# Patient Record
Sex: FEMALE | Race: WHITE | NOT HISPANIC OR LATINO | Employment: OTHER | URBAN - METROPOLITAN AREA
[De-identification: names, ages, dates, MRNs, and addresses within clinical notes are randomized per-mention and may not be internally consistent; named-entity substitution may affect disease eponyms.]

---

## 2023-08-03 LAB
CHOLESTEROL (MG/DL) IN SER/PLAS: 130 MG/DL (ref 0–199)
CHOLESTEROL IN HDL (MG/DL) IN SER/PLAS: 46.7 MG/DL
CHOLESTEROL/HDL RATIO: 2.8
LDL: 67 MG/DL (ref 0–99)
TRIGLYCERIDE (MG/DL) IN SER/PLAS: 84 MG/DL (ref 0–149)
VLDL: 17 MG/DL (ref 0–40)

## 2023-10-10 PROBLEM — G89.29 CHRONIC PAIN OF LEFT KNEE: Status: ACTIVE | Noted: 2023-10-10

## 2023-10-10 PROBLEM — G89.4 CHRONIC PAIN SYNDROME: Status: ACTIVE | Noted: 2023-10-10

## 2023-10-10 PROBLEM — E78.5 HYPERLIPIDEMIA: Status: ACTIVE | Noted: 2023-10-10

## 2023-10-10 PROBLEM — M25.562 CHRONIC PAIN OF LEFT KNEE: Status: ACTIVE | Noted: 2023-10-10

## 2023-10-10 PROBLEM — M48.061 LUMBAR STENOSIS: Status: ACTIVE | Noted: 2023-10-10

## 2023-10-10 PROBLEM — I34.0 MITRAL REGURGITATION: Status: ACTIVE | Noted: 2023-10-10

## 2023-10-10 PROBLEM — M25.561 CHRONIC PAIN OF RIGHT KNEE: Status: ACTIVE | Noted: 2023-10-10

## 2023-10-10 PROBLEM — I49.1 PAC (PREMATURE ATRIAL CONTRACTION): Status: ACTIVE | Noted: 2023-10-10

## 2023-10-10 PROBLEM — M17.0 PRIMARY OSTEOARTHRITIS OF BOTH KNEES: Status: ACTIVE | Noted: 2023-10-10

## 2023-10-10 PROBLEM — M84.454A INSUFFICIENCY FRACTURE OF PELVIS: Status: ACTIVE | Noted: 2023-10-10

## 2023-10-10 PROBLEM — I10 BENIGN ESSENTIAL HYPERTENSION: Status: ACTIVE | Noted: 2023-10-10

## 2023-10-10 PROBLEM — G89.29 CHRONIC RIGHT-SIDED LOW BACK PAIN WITHOUT SCIATICA: Status: ACTIVE | Noted: 2023-10-10

## 2023-10-10 PROBLEM — G89.29 CHRONIC PAIN OF RIGHT KNEE: Status: ACTIVE | Noted: 2023-10-10

## 2023-10-10 PROBLEM — M47.816 LUMBAR SPONDYLOSIS: Status: ACTIVE | Noted: 2023-10-10

## 2023-10-10 PROBLEM — I25.84 CORONARY ARTERY CALCIFICATION: Status: ACTIVE | Noted: 2023-10-10

## 2023-10-10 PROBLEM — R06.02 SOB (SHORTNESS OF BREATH) ON EXERTION: Status: ACTIVE | Noted: 2023-10-10

## 2023-10-10 PROBLEM — I25.10 CORONARY ARTERY CALCIFICATION: Status: ACTIVE | Noted: 2023-10-10

## 2023-10-10 PROBLEM — M54.50 CHRONIC RIGHT-SIDED LOW BACK PAIN WITHOUT SCIATICA: Status: ACTIVE | Noted: 2023-10-10

## 2023-10-10 RX ORDER — AMLODIPINE BESYLATE 10 MG/1
1 TABLET ORAL DAILY
COMMUNITY

## 2023-10-10 RX ORDER — PANTOPRAZOLE SODIUM 40 MG/1
1 TABLET, DELAYED RELEASE ORAL DAILY
COMMUNITY
Start: 2016-06-09

## 2023-10-10 RX ORDER — GABAPENTIN 100 MG/1
1 CAPSULE ORAL 2 TIMES DAILY
COMMUNITY
Start: 2022-03-22 | End: 2023-11-17 | Stop reason: DRUGHIGH

## 2023-10-10 RX ORDER — TRAZODONE HYDROCHLORIDE 50 MG/1
TABLET ORAL
COMMUNITY
Start: 2023-03-30

## 2023-10-10 RX ORDER — ROSUVASTATIN CALCIUM 20 MG/1
1 TABLET, COATED ORAL DAILY
COMMUNITY
Start: 2021-09-08 | End: 2024-03-04 | Stop reason: SDUPTHER

## 2023-10-10 RX ORDER — CLONAZEPAM 0.5 MG/1
TABLET, ORALLY DISINTEGRATING ORAL
COMMUNITY
End: 2024-03-04 | Stop reason: WASHOUT

## 2023-10-10 RX ORDER — QUINIDINE SULFATE 200 MG
TABLET ORAL
COMMUNITY
Start: 2022-02-21 | End: 2024-03-04 | Stop reason: WASHOUT

## 2023-10-10 RX ORDER — FLUOXETINE HYDROCHLORIDE 40 MG/1
CAPSULE ORAL
COMMUNITY
End: 2024-03-04 | Stop reason: WASHOUT

## 2023-10-10 RX ORDER — TRAMADOL HYDROCHLORIDE 50 MG/1
TABLET ORAL
COMMUNITY
Start: 2023-04-10 | End: 2023-11-17 | Stop reason: WASHOUT

## 2023-10-10 RX ORDER — METHOCARBAMOL 750 MG/1
1 TABLET, FILM COATED ORAL 3 TIMES DAILY PRN
COMMUNITY
Start: 2022-06-13 | End: 2023-11-17 | Stop reason: WASHOUT

## 2023-10-10 RX ORDER — GABAPENTIN 400 MG/1
1 CAPSULE ORAL NIGHTLY
COMMUNITY
Start: 2018-08-27 | End: 2023-11-17 | Stop reason: DRUGHIGH

## 2023-10-10 RX ORDER — LANCETS 33 GAUGE
EACH MISCELLANEOUS 2 TIMES DAILY
COMMUNITY
End: 2024-03-04 | Stop reason: WASHOUT

## 2023-10-10 RX ORDER — BLOOD SUGAR DIAGNOSTIC
STRIP MISCELLANEOUS 2 TIMES DAILY
COMMUNITY
Start: 2017-01-23 | End: 2024-03-04 | Stop reason: WASHOUT

## 2023-10-10 RX ORDER — FLUOXETINE HYDROCHLORIDE 20 MG/1
3 CAPSULE ORAL DAILY
COMMUNITY
Start: 2019-09-17 | End: 2024-03-04 | Stop reason: WASHOUT

## 2023-10-10 RX ORDER — GABAPENTIN 300 MG/1
CAPSULE ORAL 3 TIMES DAILY
COMMUNITY
End: 2023-11-17 | Stop reason: DRUGHIGH

## 2023-10-11 ENCOUNTER — OFFICE VISIT (OUTPATIENT)
Dept: PAIN MEDICINE | Facility: HOSPITAL | Age: 78
End: 2023-10-11
Payer: MEDICARE

## 2023-10-11 DIAGNOSIS — M96.1 POSTLAMINECTOMY SYNDROME, LUMBAR REGION: Primary | ICD-10-CM

## 2023-10-11 DIAGNOSIS — M79.7 FIBROMYALGIA: ICD-10-CM

## 2023-10-11 PROBLEM — M53.3 NONTRAUMATIC COCCYDYNIA: Status: RESOLVED | Noted: 2023-10-11 | Resolved: 2023-10-11

## 2023-10-11 PROBLEM — M53.3 NONTRAUMATIC COCCYDYNIA: Status: ACTIVE | Noted: 2023-10-11

## 2023-10-11 PROCEDURE — 99214 OFFICE O/P EST MOD 30 MIN: CPT | Performed by: ANESTHESIOLOGY

## 2023-10-11 PROCEDURE — 1125F AMNT PAIN NOTED PAIN PRSNT: CPT | Performed by: ANESTHESIOLOGY

## 2023-10-11 ASSESSMENT — PAIN - FUNCTIONAL ASSESSMENT: PAIN_FUNCTIONAL_ASSESSMENT: 0-10

## 2023-10-11 ASSESSMENT — PAIN SCALES - GENERAL: PAINLEVEL_OUTOF10: 6

## 2023-10-11 NOTE — PROGRESS NOTES
Subjective   Patient ID: Lauren Perez is a 77 y.o. female who presents for evaluation of tailbone pain.   Patient endorses she fell multiple times on her buttock in March after having COVID.  Since then she has had tailbone pain that has prohibited her from sitting, laying flat.  Patient endorses pain to be burning, stabbing, pinching, throbbing and constant.  Patient endorses sleeping at a 45 degree angle.  She uses a pillow without much relief.  Recently underwent SI injection in the interventional radiology procedure area with no relief of this pain.  Patient is also in aquatic therapy and continues to go 3 times a week.  Patient takes gabapentin and does not endorse relief of this pain.  She states that the pain is worse with sitting as well as with lying down.  She denies clear radicular symptoms to the lower extremities.    Review of Systems   All other systems reviewed and are negative.         Current Outpatient Medications:     amLODIPine (Norvasc) 10 mg tablet, Take 1 tablet (10 mg) by mouth once daily., Disp: , Rfl:     calcium cit/mag/D3/Zn//torrey (CALCIUM CITRATE PLUS ORAL), Take by mouth., Disp: , Rfl:     clonazePAM (KlonoPIN) 0.5 mg disintegrating tablet, Take by mouth., Disp: , Rfl:     FLUoxetine (PROzac) 20 mg capsule, Take 3 capsules (60 mg) by mouth once daily., Disp: , Rfl:     FLUoxetine (PROzac) 40 mg capsule, Take by mouth., Disp: , Rfl:     gabapentin (Neurontin) 100 mg capsule, Take 1 capsule (100 mg) by mouth 2 times a day., Disp: , Rfl:     gabapentin (Neurontin) 300 mg capsule, Take by mouth 3 times a day. Take 1 capsule in the AM and 2 capsules HS, Disp: , Rfl:     gabapentin (Neurontin) 400 mg capsule, Take 1 capsule (400 mg) by mouth once daily at bedtime., Disp: , Rfl:     lancets 33 gauge misc, 2 times a day. Use to test blood sugar, Disp: , Rfl:     methocarbamol (Robaxin) 750 mg tablet, Take 1 tablet (750 mg) by mouth 3 times a day as needed for muscle spasms., Disp: , Rfl:      mv-mn-iron-FA-herbal cmplx#190 (Vitamin D3 Complete) 18 mg iron-800 mcg-150 mg tablet, Take by mouth., Disp: , Rfl:     mv-mn/om3/dha/epa/fish/lut/savanna (OCUVITE ADULT 50 PLUS ORAL), Take as directed, Disp: , Rfl:     OneTouch Ultra Test strip, 2 times a day.  USE TO TEST BLOOD SUGAR, Disp: , Rfl:     pantoprazole (ProtoNix) 40 mg EC tablet, Take 1 tablet (40 mg) by mouth once daily., Disp: , Rfl:     rosuvastatin (Crestor) 20 mg tablet, Take 1 tablet (20 mg) by mouth once daily., Disp: , Rfl:     traMADol (Ultram) 50 mg tablet, , Disp: , Rfl:     traZODone (Desyrel) 50 mg tablet, , Disp: , Rfl:      Past Medical History:   Diagnosis Date    Encounter for screening for cardiovascular disorders 10/30/2019    Screening for heart disease        Past Surgical History:   Procedure Laterality Date    OTHER SURGICAL HISTORY  04/13/2022    Lumbar vertebral fusion        Family History   Problem Relation Name Age of Onset    Heart attack Mother      Heart attack Father          Allergies   Allergen Reactions    Codeine Other     chest pain    Sulfamethoxazole-Trimethoprim Unknown    Ace Inhibitors Rash        Objective     There were no vitals filed for this visit.     Physical Exam    GENERAL EXAM  Vital Signs: Vital signs to include heart rate, respiration rate, blood pressure, and temperature were reviewed.  General Appearance:  Awake, alert, healthy appearing, well developed, No acute distress.  Head: Normocephalic without evidence of head injury.  Neck: The appearance of the neck was normal without swelling with a midline trachea.  Eyes: The eyelids and eyebrows exhibited no abnormalities.  Pupils were not pin-point.  Sclera was without icterus.  Lungs: Respiration rhythm and depth was normal.  Respiratory movements were normal without labored breathing.  Cardiovascular: No peripheral edema was present.    Neurological: Patient was oriented to time, place, and person.  Speech was normal.  Balance, gait, and stance  were unremarkable.    Psychiatric: Appearance was normal with appropriate dress.  Mood was euthymic and affect was normal.  Skin: Affected regions were without ecchymosis or skin lesions.      Assessment/Plan   Problem List Items Addressed This Visit             ICD-10-CM    Postlaminectomy syndrome, lumbar region - Primary M96.1    Relevant Orders    Tens Device Four Lead    Referral to Physical Medicine Rehab    Referral to [facility]    Fibromyalgia M79.7    Relevant Orders    Tens Device Four Lead    Referral to Physical Medicine Rehab    Referral to [facility]     77 y.o. female who presents for with buttock pain. Pain seems to be a combination of of fibromyalgia, chronic sacroiliitis, known trochanteric bursitis and possible piriformis.  She has diffuse tenderness over her muscles suggestive of fibromyalgia.  She is able to sit comfortably especially if slouching down thus making piriformis syndrome less likely.  While she is tender over the sacroiliac joint, provocative sacroiliac joint maneuvers today are not reproducing her symptoms.  Pain is not well localized on physical exam. She had no tenderness over coccyx. She is sitting in the chair in the room however, endorses pain while sitting upright but not in a slouching posture. Negative TAVO and MAIK.  Previous SI joint injection did not provide relief, she continues to have this pain while taking gabapentin and doing aquatic therapy.  Discussed with patient that it is unclear, in her case, pinpointing the exact etiology of her pain and that she would benefit from an evaluation by our rehabilitation specialist colleagues.       Plan:  PMR referral   Refer to integrative pain medicine  TENS unit

## 2023-10-20 DIAGNOSIS — M25.552 CHRONIC HIP PAIN, BILATERAL: ICD-10-CM

## 2023-10-20 DIAGNOSIS — G89.29 CHRONIC HIP PAIN, BILATERAL: ICD-10-CM

## 2023-10-20 DIAGNOSIS — M25.551 CHRONIC HIP PAIN, BILATERAL: ICD-10-CM

## 2023-10-24 ENCOUNTER — APPOINTMENT (OUTPATIENT)
Dept: ORTHOPEDIC SURGERY | Facility: CLINIC | Age: 78
End: 2023-10-24
Payer: MEDICARE

## 2023-11-07 ENCOUNTER — ANCILLARY PROCEDURE (OUTPATIENT)
Dept: RADIOLOGY | Facility: CLINIC | Age: 78
End: 2023-11-07
Payer: MEDICARE

## 2023-11-07 ENCOUNTER — OFFICE VISIT (OUTPATIENT)
Dept: ORTHOPEDIC SURGERY | Facility: CLINIC | Age: 78
End: 2023-11-07
Payer: MEDICARE

## 2023-11-07 VITALS — HEIGHT: 61 IN | WEIGHT: 125 LBS | BODY MASS INDEX: 23.6 KG/M2

## 2023-11-07 DIAGNOSIS — M70.61 TROCHANTERIC BURSITIS OF BOTH HIPS: Primary | ICD-10-CM

## 2023-11-07 DIAGNOSIS — M25.552 CHRONIC HIP PAIN, BILATERAL: ICD-10-CM

## 2023-11-07 DIAGNOSIS — G89.29 CHRONIC HIP PAIN, BILATERAL: ICD-10-CM

## 2023-11-07 DIAGNOSIS — M70.62 TROCHANTERIC BURSITIS OF BOTH HIPS: Primary | ICD-10-CM

## 2023-11-07 DIAGNOSIS — M79.7 FIBROMYALGIA: ICD-10-CM

## 2023-11-07 DIAGNOSIS — M96.1 POSTLAMINECTOMY SYNDROME, LUMBAR REGION: ICD-10-CM

## 2023-11-07 DIAGNOSIS — M25.551 CHRONIC HIP PAIN, BILATERAL: ICD-10-CM

## 2023-11-07 PROCEDURE — 3078F DIAST BP <80 MM HG: CPT | Performed by: ORTHOPAEDIC SURGERY

## 2023-11-07 PROCEDURE — 99213 OFFICE O/P EST LOW 20 MIN: CPT | Performed by: ORTHOPAEDIC SURGERY

## 2023-11-07 PROCEDURE — 1159F MED LIST DOCD IN RCRD: CPT | Performed by: ORTHOPAEDIC SURGERY

## 2023-11-07 PROCEDURE — 73522 X-RAY EXAM HIPS BI 3-4 VIEWS: CPT | Mod: BILATERAL PROCEDURE | Performed by: RADIOLOGY

## 2023-11-07 PROCEDURE — 1160F RVW MEDS BY RX/DR IN RCRD: CPT | Performed by: ORTHOPAEDIC SURGERY

## 2023-11-07 PROCEDURE — 20610 DRAIN/INJ JOINT/BURSA W/O US: CPT | Performed by: ORTHOPAEDIC SURGERY

## 2023-11-07 PROCEDURE — 73522 X-RAY EXAM HIPS BI 3-4 VIEWS: CPT | Mod: FY

## 2023-11-07 PROCEDURE — 3075F SYST BP GE 130 - 139MM HG: CPT | Performed by: ORTHOPAEDIC SURGERY

## 2023-11-07 PROCEDURE — 1036F TOBACCO NON-USER: CPT | Performed by: ORTHOPAEDIC SURGERY

## 2023-11-07 PROCEDURE — 1125F AMNT PAIN NOTED PAIN PRSNT: CPT | Performed by: ORTHOPAEDIC SURGERY

## 2023-11-07 RX ORDER — TRIAMCINOLONE ACETONIDE 40 MG/ML
40 INJECTION, SUSPENSION INTRA-ARTICULAR; INTRAMUSCULAR
Status: COMPLETED | OUTPATIENT
Start: 2023-11-07 | End: 2023-11-07

## 2023-11-07 RX ORDER — LIDOCAINE HYDROCHLORIDE 10 MG/ML
1 INJECTION INFILTRATION; PERINEURAL
Status: COMPLETED | OUTPATIENT
Start: 2023-11-07 | End: 2023-11-07

## 2023-11-07 RX ORDER — ALBUTEROL SULFATE 90 UG/1
AEROSOL, METERED RESPIRATORY (INHALATION)
COMMUNITY
Start: 2019-12-16

## 2023-11-07 RX ADMIN — LIDOCAINE HYDROCHLORIDE 1 ML: 10 INJECTION INFILTRATION; PERINEURAL at 18:19

## 2023-11-07 RX ADMIN — TRIAMCINOLONE ACETONIDE 40 MG: 40 INJECTION, SUSPENSION INTRA-ARTICULAR; INTRAMUSCULAR at 18:19

## 2023-11-07 NOTE — PROGRESS NOTES
78-year-old is seen with bilateral hip pain.  She is been having persistent moderate throbbing pain in the lateral side of both hips.  She typically has relief with injections.  She was recently in pain management and was referred to physiatry and to [facility].    Pleasant in no acute distress.  Walks a mildly antalgic gait.  Both hips flex 90 degrees with adequate internal/external rotation there is tenderness on the greater trochanteric bursa both hips.    Discussion about greater trochanteric bursitis was done.  Treatment options were reviewed and the decision was made to proceed with cortisone injections.  She also recently has had diarrhea and was advised to discuss this with her primary care doctor.  She was also advised to schedule with physiatry and [facility] as recommended by pain management.    L Inj/Asp: bilateral greater trochanteric bursa on 11/7/2023 6:19 PM  Indications: pain  Details: 22 G needle, lateral approach  Medications (Right): 1 mL lidocaine 10 mg/mL (1 %); 40 mg triamcinolone acetonide 40 mg/mL  Medications (Left): 1 mL lidocaine 10 mg/mL (1 %); 40 mg triamcinolone acetonide 40 mg/mL  Outcome: tolerated well, no immediate complications  Procedure, treatment alternatives, risks and benefits explained, specific risks discussed. Consent was given by the patient. Immediately prior to procedure a time out was called to verify the correct patient, procedure, equipment, support staff and site/side marked as required. Patient was prepped and draped in the usual sterile fashion.

## 2023-11-17 ENCOUNTER — CONSULT (OUTPATIENT)
Dept: PHYSICAL MEDICINE AND REHAB | Facility: CLINIC | Age: 78
End: 2023-11-17
Payer: MEDICARE

## 2023-11-17 VITALS
SYSTOLIC BLOOD PRESSURE: 125 MMHG | WEIGHT: 128 LBS | DIASTOLIC BLOOD PRESSURE: 61 MMHG | BODY MASS INDEX: 24.19 KG/M2 | HEART RATE: 64 BPM | TEMPERATURE: 97.5 F

## 2023-11-17 DIAGNOSIS — M79.7 FIBROMYALGIA: ICD-10-CM

## 2023-11-17 DIAGNOSIS — M46.1 BILATERAL SACROILIITIS (CMS-HCC): Primary | ICD-10-CM

## 2023-11-17 DIAGNOSIS — M79.18 MYOFASCIAL MUSCLE PAIN: ICD-10-CM

## 2023-11-17 PROCEDURE — 99205 OFFICE O/P NEW HI 60 MIN: CPT | Performed by: PHYSICAL MEDICINE & REHABILITATION

## 2023-11-17 RX ORDER — DULOXETIN HYDROCHLORIDE 30 MG/1
30 CAPSULE, DELAYED RELEASE ORAL DAILY
Status: DISCONTINUED | OUTPATIENT
Start: 2023-11-17 | End: 2024-02-16

## 2023-11-17 NOTE — PROGRESS NOTES
Dear Dr. Marley, I had the pleasure of meeting Lauren Perez, a 78 year old female with PMH of lumbar laminectomy and fusion L4, 5, S1 in 2018, HTN, HLD, esophageal obstruction, arthritis of the knees, fibromyalgia, depression, and anxiety here for evaluation of chronic pain in the tailbone and buttock area     TIMELINE OF COMPLAINT(S):  Back surgery in 2018 was helpful. Symptoms started over a year ago, pain exacerbated after falling in March 2023 with associated rib fracture. Lost about 40 pounds In the last 2 years d/t issues with esophagus. Has been sitting on U shaped cushion which has not been helpful, difficulty getting comfortable    Of note, has had injections for her bilateral knees (about 3 months ago) and bilateral intertrochanteric bursa (a couple weeks ago) with Dr. Marley which were helpful. Saw Dr. Hendrickson, received epidural in the past which made symptoms worse, SIJ injection as well which was not helpful    Pain:   LOCATION- Tailbone, worse on right  RADIATION- None  NUMBNESS/TINGLING- No  WEAKNESS- No  CONSTANT or INTERMITTENT- Constant  SEVERITY/QUANTITY- 9/10  QUALITY- Sharp, achy, pressure, gnawing  EXACERBATED BY- Sitting on it, long distance walking  BETTER WITH- icing  TRIED- tramadol after fall, no longer taking. Diclofenac gel once daily      Anti-Inflammatories: tylenol helpful for knees      Muscle relaxants: Robaxin many years ago for different issue      Anti-depressants: Fluoxetine (Dr. Ruvalcaba) trazodone. States she tried cymbalta many years ago      Neuroleptics: gabapentin 300 mg in the morning, 600 mg qhs     PHYSICAL THERAPY: aquatherapy 1 year ago, has not been going over the last 2 months, no PT for this issue  TENS unit: None  CHIROPRACTIC MANIPULATION: None  ACUPUNCTURE TREATMENTS: None  DEEP TISSUE MASSAGE THERAPY: None  OSTEOPATHIC MANIPULATION THERAPY: None  INJECTIONS: Knee, JOSE, bursa injections as described  EMG/NCS: None     IMAGING:  Bilateral hip x rays from 11/8/23: Mild  OA of b/l hip and SI joints    Knee x ray from 5/22/23: Mild medial compartment OA L>R    X ray lumbar spine 6/13/22: Mild L2 vertebral compression fx, laminectomy with L4-S1 fusion    MRI pelvis 3/8/22: Mild bilateral hip osteoarthritis, No MR evidence of internal derangement of the pelvis    MRI lumbar spine 3/8/22: bilateral foraminal narrowing, post surgical changes stable, no residual canal stenosis    FUNCTIONAL HISTORY: The patient is independent in all ADLs, mobility, and driving. The patient will rarely use a cane or walker for long distances     SH:  Lives in: apartment, no steps, uses elevator  Lives with: Lives alone, brother in [facility]  Occupation: Retired   Tobacco: Denies  Alcohol: Denies  Drugs: Denies     ROS: Reports mild urinary incontinence, likely stress incontinence that has not worsened. SOB with prolonged activity. Reports anxiety controlled with medication. The patient denies any bowel incontinence/accidents, night sweats, fevers, chills, recent significant weight loss. A 14 point review of systems was reviewed with the patient and is as above and otherwise negative. Please see scanned questionnaire for more details.     PE:  GEN - Alert, well-developed, well-nourished, no acute distress  PSYCH - Cooperative, appropriate mood and affect  HEENT - NC/AT  RESP - Non-labored respirations, equal expansion  CV - warm and well-perfused, No cyanosis or edema in extremities.  ABD- soft, ND  SKIN - No rash.     BACK/SPINE - Symmetric posture, no erythema, edema, or swelling. No pain with extension, rotation, or side bend. No pain with facet loading. TTP over L% and sacral spine. TTP over b/l paraspinal muscles R > L. TTP over bilateral SIJ R>L, extremely tender over b/l GT bursa, TTP over IT band bilaterally. No tenderness over the iliolumbar ligaments bilaterally. No TTP of bilateral PSIS.     HIPS/PELVIS - Symmetric in standing and lying Passive hip flexion, internal rotation,  and external rotation within functional normal limits bilaterally without provocation of pain symptoms. No tenderness over iliopsoas tendon. FABERs + bilaterally to tonya and lateral hip with R>L. Negative hip clicking. hip Negative hip impingement test. Negative log roll.      NEURO -   LE strength 5/5 -  including hip flexors, knee flexors, knee extensors, ankle dorsiflexors, ankle plantar flexors, and EHL  Sensation - intact to light touch in bilateral lower extremities.  Reflexes - 2+ biceps, brachioradialis, triceps, patellar and Achilles reflexes bilaterally No Clonus, No Babinski, Caballero's negative bilaterally  GAIT - Antalgic, Normal base, normal stride length    PLAN:   Lauren Perez, a 78 year old female with PMH of lumbar laminectomy and fusion L4, 5, S1 in 2018, HTN, HLD, esophageal obstruction, arthritis of the knees, fibromyalgia, depression, and anxiety here for evaluation of chronic pain in the tailbone and buttock area. Pain appears multifactorial with SIJ dysfunction, myofascial pain, and fibromyalgia all likely contributing. Exam reassuring for lack of neurological compromise.    -Taper Fluoxetine with goal of starting cymbalta given it is just as useful for pain as for anxiety/depression  -Taper down to 40 mg of fluoxetine today, then 20 mg after 2 weeks, then stop the fluoxetine 20 mg after 2 weeks and begin cymbalta  -Will place prescription for Cymbalta now, begin once you have completed Fluoxetine taper  -Cymbalta initially at 30 mg daily for the first week, increasing to 60 mg after the first week  -Patient had already stopped taking gabapentin, will discontinue gabapentin for now, con consider resuming at higher dose in future  -Patient does have exercise bike, emphasized importance of low aerobic impact exercise  -Begin applying diclofenac QID to affected area  -Can consider resuming aquatherapy, trigger point injections, SIJ injections, muscle relaxers, higher dose of gabapentin in the  future  -Follow up in 2 months      The patient expressed understanding and agreement with the assessment and plan. Patient encouraged to contact us should they have any questions, concerns, or any changes in symptoms.     Thank you for allowing me to participate in the care of your patient.    Patient seen and examined by resident physician with attending physician supervision, Dr. Dickerson. Attending agrees with history, assessment, and plan as described in note.    Theresa Mcfadden,    PM&R PGY-4

## 2023-11-17 NOTE — PATIENT INSTRUCTIONS
-Taper Fluoxetine with goal of starting cymbalta given it is just as useful for pain as for anxiety/depression  -Taper down to 40 mg of fluoxetine today, then to 20 mg after 2 weeks, then stop the fluoxetine 20 mg after 2 weeks and begin cymbalta 30 mg daily  -Will place prescription for Cymbalta now, begin once you have completed Fluoxetine taper  -Cymbalta initially at 30 mg daily for the first week, increasing to 60 mg after the first week  -Patient had already stopped taking gabapentin, will discontinue gabapentin for now  -Patient does have exercise bike, emphasized importance of low aerobic impact exercise  -Begin applying diclofenac four times daily to affected area  -Can consider resuming aquatherapy, trigger point injections, muscle relaxers, higher dose of gabapentin in the future  -Follow up in 2 months

## 2024-01-19 ENCOUNTER — APPOINTMENT (OUTPATIENT)
Dept: PHYSICAL MEDICINE AND REHAB | Facility: CLINIC | Age: 79
End: 2024-01-19
Payer: MEDICARE

## 2024-02-16 ENCOUNTER — OFFICE VISIT (OUTPATIENT)
Dept: PHYSICAL MEDICINE AND REHAB | Facility: CLINIC | Age: 79
End: 2024-02-16
Payer: MEDICARE

## 2024-02-16 DIAGNOSIS — M96.1 POSTLAMINECTOMY SYNDROME, LUMBAR REGION: Primary | ICD-10-CM

## 2024-02-16 DIAGNOSIS — G89.29 CHRONIC RIGHT-SIDED LOW BACK PAIN WITHOUT SCIATICA: ICD-10-CM

## 2024-02-16 DIAGNOSIS — M79.7 FIBROMYALGIA: ICD-10-CM

## 2024-02-16 DIAGNOSIS — M46.1 BILATERAL SACROILIITIS (CMS-HCC): ICD-10-CM

## 2024-02-16 DIAGNOSIS — M54.50 CHRONIC RIGHT-SIDED LOW BACK PAIN WITHOUT SCIATICA: ICD-10-CM

## 2024-02-16 PROCEDURE — 1036F TOBACCO NON-USER: CPT | Performed by: STUDENT IN AN ORGANIZED HEALTH CARE EDUCATION/TRAINING PROGRAM

## 2024-02-16 PROCEDURE — 1125F AMNT PAIN NOTED PAIN PRSNT: CPT | Performed by: STUDENT IN AN ORGANIZED HEALTH CARE EDUCATION/TRAINING PROGRAM

## 2024-02-16 PROCEDURE — 99214 OFFICE O/P EST MOD 30 MIN: CPT | Performed by: PHYSICAL MEDICINE & REHABILITATION

## 2024-02-16 PROCEDURE — 1157F ADVNC CARE PLAN IN RCRD: CPT | Performed by: STUDENT IN AN ORGANIZED HEALTH CARE EDUCATION/TRAINING PROGRAM

## 2024-02-16 RX ORDER — DULOXETIN HYDROCHLORIDE 30 MG/1
30 CAPSULE, DELAYED RELEASE ORAL DAILY
Status: DISCONTINUED | OUTPATIENT
Start: 2024-02-17 | End: 2024-02-23

## 2024-02-16 RX ORDER — GABAPENTIN 300 MG/1
300 CAPSULE ORAL 3 TIMES DAILY
Qty: 270 CAPSULE | Refills: 1 | Status: SHIPPED | OUTPATIENT
Start: 2024-02-16 | End: 2024-08-14

## 2024-02-16 NOTE — PATIENT INSTRUCTIONS
-Begin gabapentin 300 mg TID. Patient has previously tried gabapentin  but was not taking consistently. Tolerated doses up to 600 mg in the past  -New prescription for Cymbalta, Initially 30 mg cymbalta for 1 week with instructions to uptitrate to 60 mg daily  -Begin low impact aerobic exercise with exercise bike once pain more manageable  -MRI lumbar spine ordered  -Follow up in 6 weeks once lumbar MRI obtained

## 2024-02-16 NOTE — PROGRESS NOTES
"Lauren Perez is a 78 year old female with PMH of lumbar laminectomy and fusion L4, 5, S1 in 2018, HTN, HLD, esophageal obstruction, arthritis of the knees, fibromyalgia, depression, and anxiety here for evaluation of chronic pain in the tailbone and buttock area    INTERVAL HISTORY:  Voltaren has been somewhat helpful. Has not been doing low impact aerobic exercise (exercise bike) due to patient described severe gastric issue with constipation, recently resolved, polyps removed following colonoscopy. Patient weaned self off fluoxetine but was unable to get cymbalta from pharmacy. Patient did get lumbar x ray from outside facility recently on 1/22/24 showing age indeterminate L2 compression fracture, narrowing L2-3.     RECALL:  \"Back surgery in 2018 was helpful. Symptoms started over a year ago, pain exacerbated after falling in March 2023 with associated rib fracture. Lost about 40 pounds In the last 2 years d/t issues with esophagus. Has been sitting on U shaped cushion which has not been helpful, difficulty getting comfortable     Of note, has had injections for her bilateral knees (about 3 months ago) and bilateral intertrochanteric bursa (a couple weeks ago) with Dr. Marley which were helpful. Saw Dr. Hendrickson, received epidural in the past which made symptoms worse, SIJ injection as well which was not helpful\"    PRIOR MEDS:  TRIED- tramadol after fall, no longer taking. Diclofenac gel once daily      Anti-Inflammatories: tylenol helpful for knees      Muscle relaxants: Robaxin many years ago for different issue      Anti-depressants: Fluoxetine (Dr. Ruvalcaba) trazodone. States she tried cymbalta many years ago      Neuroleptics: gabapentin 300 mg in the morning, 600 mg at bedtime    IMAGING:  Lumbar x ray from 1/22/24: age indeterminate L2 compression fracture, narrowing L2-3.      Bilateral hip x rays from 11/8/23: Mild OA of b/l hip and SI joints     Knee x ray from 5/22/23: Mild medial compartment OA L>R     X " ray lumbar spine 6/13/22: Mild L2 vertebral compression fx, laminectomy with L4-S1 fusion     MRI pelvis 3/8/22: Mild bilateral hip osteoarthritis, No MR evidence of internal derangement of the pelvis     MRI lumbar spine 3/8/22: bilateral foraminal narrowing, post surgical changes stable, no residual canal stenosis    FUNCTIONAL HISTORY: The patient is independent in all ADLs, mobility, and driving. The patient will use a cane or walker for long distances     SH:  Lives in: apartment, no steps, uses elevator  Lives with: Lives alone, brother in [facility]  Occupation: Retired   Tobacco: Denies  Alcohol: Denies  Drugs: Denies    PE:  GEN - Alert, well-developed, well-nourished, no acute distress  PSYCH - Cooperative, appropriate mood and affect  HEENT - NC/AT  RESP - Non-labored respirations, equal expansion  CV - warm and well-perfused, No cyanosis or edema in extremities.  ABD- soft, ND  SKIN - No rash.     BACK/SPINE - Pain with extension, rotation, or side bend. TTP over lumbar and sacral spine. TTP over b/l paraspinal muscles R > L. TTP over bilateral SIJ R>L, extremely tender over b/l GT bursa, TTP over IT band bilaterally.  SLR negative bilaterally     HIPS/PELVIS -  FABERs + bilaterally to tonya and lateral hip with R>L. Negative hip clicking. Negative hip impingement test. Negative log roll.      NEURO -   LE strength 5/5 -  including hip flexors, knee flexors, knee extensors, ankle dorsiflexors, ankle plantar flexors, and EHL  Sensation - intact to light touch in bilateral lower extremities.  GAIT - Antalgic, right sided limp, poor balance    PLAN:   Lauren Perez, a 78 year old female with PMH of lumbar laminectomy and fusion L4, 5, S1 in 2018, HTN, HLD, esophageal obstruction, arthritis of the knees, fibromyalgia, depression, and anxiety here for evaluation of chronic pain in the tailbone and buttock area. Pain appears multifactorial with SIJ dysfunction, lumbar stenosis, post  laminectomy syndrome, myofascial pain, and fibromyalgia all likely contributing. Exam reassuring for lack of neurological compromise.       -Begin gabapentin 300 mg TID by patient's request. Patient has previously tried gabapentin  but was not taking consistently. Tolerated doses up to 600 mg in the past.  -New prescription provided for Cymbalta, Initially 30 mg cymbalta for 1 week with instructions to uptitrate to 60 mg daily  -Begin low impact aerobic exercise with exercise bike once pain more manageable  -MRI lumbar spine ordered as she had a fall since most recent lumbar MRI in 2022. Patient did have lumbar compression fracture noted on prior imaging prior to fall in spring of 2023  -Follow up in 6 weeks once lumbar MRI obtained    Patient seen and examined by resident with attending supervision (Dr. Dickerson), attending agrees with history, exam, and plan as described in the note above    Theresa Mcfadden DO   PM&R PGY-IV

## 2024-02-23 ENCOUNTER — OFFICE VISIT (OUTPATIENT)
Dept: ORTHOPEDIC SURGERY | Facility: CLINIC | Age: 79
End: 2024-02-23
Payer: MEDICARE

## 2024-02-23 VITALS — BODY MASS INDEX: 23.03 KG/M2 | WEIGHT: 122 LBS | HEIGHT: 61 IN

## 2024-02-23 DIAGNOSIS — M79.7 FIBROMYALGIA: ICD-10-CM

## 2024-02-23 DIAGNOSIS — M25.551 CHRONIC RIGHT HIP PAIN: ICD-10-CM

## 2024-02-23 DIAGNOSIS — M79.18 MYOFASCIAL MUSCLE PAIN: Primary | ICD-10-CM

## 2024-02-23 DIAGNOSIS — G89.29 CHRONIC RIGHT-SIDED LOW BACK PAIN WITHOUT SCIATICA: ICD-10-CM

## 2024-02-23 DIAGNOSIS — M46.1 BILATERAL SACROILIITIS (CMS-HCC): ICD-10-CM

## 2024-02-23 DIAGNOSIS — M70.62 TROCHANTERIC BURSITIS OF BOTH HIPS: Primary | ICD-10-CM

## 2024-02-23 DIAGNOSIS — G89.29 CHRONIC RIGHT HIP PAIN: ICD-10-CM

## 2024-02-23 DIAGNOSIS — M70.61 TROCHANTERIC BURSITIS OF BOTH HIPS: Primary | ICD-10-CM

## 2024-02-23 DIAGNOSIS — M54.50 CHRONIC RIGHT-SIDED LOW BACK PAIN WITHOUT SCIATICA: ICD-10-CM

## 2024-02-23 PROCEDURE — 1159F MED LIST DOCD IN RCRD: CPT | Performed by: ORTHOPAEDIC SURGERY

## 2024-02-23 PROCEDURE — 20610 DRAIN/INJ JOINT/BURSA W/O US: CPT | Performed by: ORTHOPAEDIC SURGERY

## 2024-02-23 PROCEDURE — 1036F TOBACCO NON-USER: CPT | Performed by: ORTHOPAEDIC SURGERY

## 2024-02-23 PROCEDURE — 1160F RVW MEDS BY RX/DR IN RCRD: CPT | Performed by: ORTHOPAEDIC SURGERY

## 2024-02-23 PROCEDURE — 99213 OFFICE O/P EST LOW 20 MIN: CPT | Performed by: ORTHOPAEDIC SURGERY

## 2024-02-23 PROCEDURE — 1157F ADVNC CARE PLAN IN RCRD: CPT | Performed by: ORTHOPAEDIC SURGERY

## 2024-02-23 PROCEDURE — 1125F AMNT PAIN NOTED PAIN PRSNT: CPT | Performed by: ORTHOPAEDIC SURGERY

## 2024-02-23 RX ORDER — DULOXETIN HYDROCHLORIDE 30 MG/1
30 CAPSULE, DELAYED RELEASE ORAL DAILY
Qty: 30 CAPSULE | Refills: 11 | Status: SHIPPED | OUTPATIENT
Start: 2024-02-23 | End: 2024-05-08 | Stop reason: ALTCHOICE

## 2024-02-23 RX ORDER — LIDOCAINE HYDROCHLORIDE 10 MG/ML
2 INJECTION INFILTRATION; PERINEURAL
Status: COMPLETED | OUTPATIENT
Start: 2024-02-23 | End: 2024-02-23

## 2024-02-23 RX ORDER — TRIAMCINOLONE ACETONIDE 40 MG/ML
40 INJECTION, SUSPENSION INTRA-ARTICULAR; INTRAMUSCULAR
Status: COMPLETED | OUTPATIENT
Start: 2024-02-23 | End: 2024-02-23

## 2024-02-23 RX ADMIN — TRIAMCINOLONE ACETONIDE 40 MG: 40 INJECTION, SUSPENSION INTRA-ARTICULAR; INTRAMUSCULAR at 14:52

## 2024-02-23 RX ADMIN — LIDOCAINE HYDROCHLORIDE 2 ML: 10 INJECTION INFILTRATION; PERINEURAL at 14:52

## 2024-02-23 NOTE — PROGRESS NOTES
78-year-old is seen with right hip pain.  Left hip has bothered her previously but is not currently giving her significant pain.  She has been having persistent moderate throbbing pain on the lateral side of the hip.  Pain is worse with standing and walking.  She is undergoing evaluation and pain management for her back and has an MRI scheduled.  X-rays have demonstrated a L2 compression fracture.    Pleasant in no acute distress.  Walks an antalgic gait.  Right hip flexes 90 degrees with adequate internal and external rotation.  There is tenderness over the trochanteric bursa.  Hip flexes 90 degrees with adequate internal and external rotation and no significant tenderness over the trochanteric bursa.    Multiple x-ray views of the lumbosacral spine are personally reviewed and there is a L2 compression fracture with slightly less than 50% height loss.  The lumbar fusion is visualized.  There is degenerative changes.    A discussion about her pain was done.  Decision was made to proceed with trochanteric bursa injection.  She will continue with the pain management recommendations and follow-up based on her symptoms.    L Inj/Asp: R greater trochanteric bursa on 2/23/2024 2:52 PM  Indications: pain  Details: 22 G needle, lateral approach  Medications: 40 mg triamcinolone acetonide 40 mg/mL; 2 mL lidocaine 10 mg/mL (1 %)  Procedure, treatment alternatives, risks and benefits explained, specific risks discussed. Consent was given by the patient.

## 2024-02-27 ENCOUNTER — HOSPITAL ENCOUNTER (OUTPATIENT)
Dept: RADIOLOGY | Facility: HOSPITAL | Age: 79
Discharge: HOME | End: 2024-02-27
Payer: MEDICARE

## 2024-02-27 DIAGNOSIS — G89.29 CHRONIC RIGHT-SIDED LOW BACK PAIN WITHOUT SCIATICA: ICD-10-CM

## 2024-02-27 DIAGNOSIS — M54.50 CHRONIC RIGHT-SIDED LOW BACK PAIN WITHOUT SCIATICA: ICD-10-CM

## 2024-02-27 DIAGNOSIS — M96.1 POSTLAMINECTOMY SYNDROME, LUMBAR REGION: ICD-10-CM

## 2024-02-27 PROBLEM — E11.9 CONTROLLED TYPE 2 DIABETES MELLITUS WITHOUT COMPLICATION (MULTI): Status: ACTIVE | Noted: 2022-10-06

## 2024-02-27 PROBLEM — M19.90 ARTHRITIS: Status: ACTIVE | Noted: 2024-02-27

## 2024-02-27 PROBLEM — M77.12 LATERAL EPICONDYLITIS OF LEFT ELBOW: Status: ACTIVE | Noted: 2024-02-27

## 2024-02-27 PROBLEM — K21.9 GERD (GASTROESOPHAGEAL REFLUX DISEASE): Status: ACTIVE | Noted: 2024-02-27

## 2024-02-27 PROBLEM — M25.519 SHOULDER PAIN: Status: ACTIVE | Noted: 2024-02-27

## 2024-02-27 PROBLEM — R05.9 COUGH: Status: ACTIVE | Noted: 2024-02-27

## 2024-02-27 PROBLEM — F32.A ANXIETY AND DEPRESSION: Status: ACTIVE | Noted: 2022-10-06

## 2024-02-27 PROBLEM — F41.9 ANXIETY AND DEPRESSION: Status: ACTIVE | Noted: 2022-10-06

## 2024-02-27 PROBLEM — H91.13 PRESBYCUSIS, BILATERAL: Status: ACTIVE | Noted: 2023-08-31

## 2024-02-27 PROBLEM — M75.82 TENDINITIS OF LEFT ROTATOR CUFF: Status: ACTIVE | Noted: 2024-02-27

## 2024-02-27 PROBLEM — I10 HYPERTENSION: Status: ACTIVE | Noted: 2024-02-27

## 2024-02-27 PROCEDURE — 72148 MRI LUMBAR SPINE W/O DYE: CPT | Performed by: RADIOLOGY

## 2024-02-27 PROCEDURE — 72148 MRI LUMBAR SPINE W/O DYE: CPT

## 2024-03-04 ENCOUNTER — OFFICE VISIT (OUTPATIENT)
Dept: CARDIOLOGY | Facility: CLINIC | Age: 79
End: 2024-03-04
Payer: MEDICARE

## 2024-03-04 VITALS
OXYGEN SATURATION: 97 % | HEIGHT: 61 IN | BODY MASS INDEX: 24.2 KG/M2 | SYSTOLIC BLOOD PRESSURE: 110 MMHG | DIASTOLIC BLOOD PRESSURE: 60 MMHG | HEART RATE: 78 BPM | WEIGHT: 128.2 LBS

## 2024-03-04 DIAGNOSIS — I10 BENIGN ESSENTIAL HYPERTENSION: ICD-10-CM

## 2024-03-04 DIAGNOSIS — I49.1 PAC (PREMATURE ATRIAL CONTRACTION): ICD-10-CM

## 2024-03-04 DIAGNOSIS — I34.0 MITRAL VALVE INSUFFICIENCY, UNSPECIFIED ETIOLOGY: ICD-10-CM

## 2024-03-04 DIAGNOSIS — I25.84 CORONARY ARTERY CALCIFICATION: ICD-10-CM

## 2024-03-04 DIAGNOSIS — E78.00 PURE HYPERCHOLESTEROLEMIA: Primary | ICD-10-CM

## 2024-03-04 DIAGNOSIS — I25.10 CORONARY ARTERY CALCIFICATION: ICD-10-CM

## 2024-03-04 PROCEDURE — 1160F RVW MEDS BY RX/DR IN RCRD: CPT | Performed by: INTERNAL MEDICINE

## 2024-03-04 PROCEDURE — 1157F ADVNC CARE PLAN IN RCRD: CPT | Performed by: INTERNAL MEDICINE

## 2024-03-04 PROCEDURE — 1125F AMNT PAIN NOTED PAIN PRSNT: CPT | Performed by: INTERNAL MEDICINE

## 2024-03-04 PROCEDURE — 1159F MED LIST DOCD IN RCRD: CPT | Performed by: INTERNAL MEDICINE

## 2024-03-04 PROCEDURE — 3074F SYST BP LT 130 MM HG: CPT | Performed by: INTERNAL MEDICINE

## 2024-03-04 PROCEDURE — 1036F TOBACCO NON-USER: CPT | Performed by: INTERNAL MEDICINE

## 2024-03-04 PROCEDURE — 3078F DIAST BP <80 MM HG: CPT | Performed by: INTERNAL MEDICINE

## 2024-03-04 PROCEDURE — 99214 OFFICE O/P EST MOD 30 MIN: CPT | Performed by: INTERNAL MEDICINE

## 2024-03-04 RX ORDER — CLONAZEPAM 0.5 MG/1
0.5 TABLET ORAL 2 TIMES DAILY
COMMUNITY
Start: 2024-02-09

## 2024-03-04 RX ORDER — ROSUVASTATIN CALCIUM 20 MG/1
20 TABLET, COATED ORAL DAILY
Qty: 90 TABLET | Refills: 3 | Status: SHIPPED | OUTPATIENT
Start: 2024-03-04 | End: 2025-03-04

## 2024-03-04 NOTE — PROGRESS NOTES
"Chief Complaint:   Hypertension, Hyperlipidemia, Shortness of Breath, and mitral regurgitation (6 month follow up)     History Of Present Illness:    Lauren Perez is a 78 y.o. female presenting with cardiovascular disease.  Some SOB -better with inhaler  Patient denies chest pain/palpitations/dizziness/lightheadedness/edema/claudication  No regular exercise-activity limited by back       Last Recorded Vitals:  Vitals:    03/04/24 1309 03/04/24 1337   BP: 120/76 110/60   BP Location: Left arm    Patient Position: Sitting    BP Cuff Size: Adult    Pulse: 78    SpO2: 97%    Weight: 58.2 kg (128 lb 3.2 oz)    Height: 1.549 m (5' 1\")             Allergies:  Codeine, Opioids - morphine analogues, Sulfamethoxazole-trimethoprim, and Ace inhibitors    Outpatient Medications:  Current Outpatient Medications   Medication Instructions    amLODIPine (Norvasc) 10 mg tablet 1 tablet, oral, Daily    clonazePAM (KLONOPIN) 0.5 mg, oral, 2 times daily    DULoxetine (CYMBALTA) 30 mg, oral, Daily, Do not crush or chew.    gabapentin (NEURONTIN) 300 mg, oral, 3 times daily    mv-mn/om3/dha/epa/fish/lut/savanna (OCUVITE ADULT 50 PLUS ORAL) Take as directed    pantoprazole (ProtoNix) 40 mg EC tablet 1 tablet, oral, Daily    ProAir HFA 90 mcg/actuation inhaler INHALE 2 PUFFS BY MOUTH AS NEEDED EVERY 6 HOURS    rosuvastatin (Crestor) 20 mg tablet 1 tablet, oral, Daily    traZODone (Desyrel) 50 mg tablet        Physical Exam:  Constitutional:       General: Awake.      Appearance: Healthy appearance. Not in distress.   Neck:      Vascular: No JVR. JVD normal.   Pulmonary:      Effort: Pulmonary effort is normal.      Breath sounds: Normal breath sounds. No wheezing. No rhonchi. No rales.   Chest:      Chest wall: Not tender to palpatation.   Cardiovascular:      PMI at left midclavicular line. Normal rate. Frequent ectopic beats. Regular rhythm. Normal S1. Normal S2.       Murmurs: There is no murmur.      No gallop.  No click. No rub. "   Pulses:     Intact distal pulses.   Edema:     Peripheral edema absent.   Abdominal:      General: Bowel sounds are normal.      Palpations: Abdomen is soft.      Tenderness: There is no abdominal tenderness.   Musculoskeletal: Normal range of motion.         General: No tenderness. Skin:     General: Skin is warm and dry.   Neurological:      General: No focal deficit present.      Mental Status: Alert and oriented to person, place and time.          Last Labs:  CBC -  Lab Results   Component Value Date    WBC 9.1 10/30/2019    HGB 11.9 (L) 10/30/2019    HCT 39.1 10/30/2019    MCV 89 10/30/2019     10/30/2019       CMP -  Lab Results   Component Value Date    CALCIUM 8.0 (L) 12/10/2018       LIPID PANEL -   Lab Results   Component Value Date    CHOL 130 08/03/2023    TRIG 84 08/03/2023    HDL 46.7 08/03/2023    CHHDL 2.8 08/03/2023    LDLF 67 08/03/2023    VLDL 17 08/03/2023       RENAL FUNCTION PANEL -   Lab Results   Component Value Date    GLUCOSE 102 (H) 12/10/2018     12/10/2018    K 3.8 12/10/2018     (H) 12/10/2018    CO2 23 12/10/2018    ANIONGAP 9 (L) 12/10/2018    BUN 16 12/10/2018    CREATININE 0.62 12/10/2018    CALCIUM 8.0 (L) 12/10/2018    12/2023. BUN=24,CR=1.5      Lab Results   Component Value Date    BNP 35 10/31/2019           Lab review: I have personally reviewed the laboratory result(s)  Today's BX and September just regular follow-up with really make any changes last time last time your weight was 130 today 128 down a couple pounds going in the right direction      Problem List Items Addressed This Visit       Benign essential hypertension    Overview     BP well controlled on current medications   12/2023 Cr=1.5         Coronary artery calcification    Overview     Noted on 6/2020 chest CT   8/2023 stress test NL   On statin … intolerant of ASA as has stomach issues   No angina         Hyperlipidemia    Overview     On high-intensity statin   8/2023 LDL=67           Mitral regurgitation    Overview     Mild-moderate per 2/2021 echo   No murmur on exam   Stable per 8/2023 echo           PAC (premature atrial contraction) - Primary    Overview     Noted on prior EKG   Noted on exam today  Asymptomatic   Prior echo with NL LVEF              Increase activity    Dao Hdez DO

## 2024-03-19 ENCOUNTER — OFFICE VISIT (OUTPATIENT)
Dept: ORTHOPEDIC SURGERY | Facility: CLINIC | Age: 79
End: 2024-03-19
Payer: MEDICARE

## 2024-03-19 VITALS — BODY MASS INDEX: 23.03 KG/M2 | HEIGHT: 61 IN | WEIGHT: 122 LBS

## 2024-03-19 DIAGNOSIS — M17.0 ARTHRITIS OF BOTH KNEES: Primary | ICD-10-CM

## 2024-03-19 DIAGNOSIS — G89.29 BILATERAL CHRONIC KNEE PAIN: ICD-10-CM

## 2024-03-19 DIAGNOSIS — M25.562 BILATERAL CHRONIC KNEE PAIN: ICD-10-CM

## 2024-03-19 DIAGNOSIS — M25.561 BILATERAL CHRONIC KNEE PAIN: ICD-10-CM

## 2024-03-19 PROCEDURE — 99213 OFFICE O/P EST LOW 20 MIN: CPT | Performed by: ORTHOPAEDIC SURGERY

## 2024-03-19 PROCEDURE — 1157F ADVNC CARE PLAN IN RCRD: CPT | Performed by: ORTHOPAEDIC SURGERY

## 2024-03-19 PROCEDURE — 1159F MED LIST DOCD IN RCRD: CPT | Performed by: ORTHOPAEDIC SURGERY

## 2024-03-19 PROCEDURE — 20610 DRAIN/INJ JOINT/BURSA W/O US: CPT | Performed by: ORTHOPAEDIC SURGERY

## 2024-03-19 PROCEDURE — 1036F TOBACCO NON-USER: CPT | Performed by: ORTHOPAEDIC SURGERY

## 2024-03-19 PROCEDURE — 1160F RVW MEDS BY RX/DR IN RCRD: CPT | Performed by: ORTHOPAEDIC SURGERY

## 2024-03-19 RX ORDER — TRIAMCINOLONE ACETONIDE 40 MG/ML
40 INJECTION, SUSPENSION INTRA-ARTICULAR; INTRAMUSCULAR
Status: COMPLETED | OUTPATIENT
Start: 2024-03-19 | End: 2024-03-19

## 2024-03-19 RX ORDER — LIDOCAINE HYDROCHLORIDE 10 MG/ML
2 INJECTION INFILTRATION; PERINEURAL
Status: COMPLETED | OUTPATIENT
Start: 2024-03-19 | End: 2024-03-19

## 2024-03-19 RX ADMIN — LIDOCAINE HYDROCHLORIDE 2 ML: 10 INJECTION INFILTRATION; PERINEURAL at 12:51

## 2024-03-19 RX ADMIN — TRIAMCINOLONE ACETONIDE 40 MG: 40 INJECTION, SUSPENSION INTRA-ARTICULAR; INTRAMUSCULAR at 12:51

## 2024-03-19 NOTE — PROGRESS NOTES
78-year-old is seen with bilateral knee pain.  She has been having persistent severe sharp shooting pain in both knees its worse with standing and walking.  Pain is worse going up and down stairs and getting up and down from a chair in and out of a car.    Pleasant and no acute distress. Walks with a antalgic gait. Stands with varus alignment of both knees. Right knee range of motion is 5-110°. There is a mild effusion. The knee is stable to varus and valgus stress Lachman and posterior drawer. There is generalized tenderness. Left knee range of motion is 5-110°. There is a mild effusion. The knee is stable to varus and valgus stress Lachman and posterior drawer. There is generalized tenderness. Both lower extremities are well perfused the skin is intact and muscle tone is adequate.    A discussion about knee arthritis was done.  Treatment options were reviewed.  The decision was made to proceed with cortisone injections.  She is going to a different pain management doctor for her back related issues.  She will perform an exercise program for lower extremity strengthening and avoid aggravating activities and follow-up based on her symptoms in regard to the knees.    L Inj/Asp: bilateral knee on 3/19/2024 12:51 PM  Indications: pain  Details: 22 G needle, superolateral approach  Medications (Right): 40 mg triamcinolone acetonide 40 mg/mL; 2 mL lidocaine 10 mg/mL (1 %)  Medications (Left): 40 mg triamcinolone acetonide 40 mg/mL; 2 mL lidocaine 10 mg/mL (1 %)  Procedure, treatment alternatives, risks and benefits explained, specific risks discussed. Consent was given by the patient.

## 2024-03-29 ENCOUNTER — APPOINTMENT (OUTPATIENT)
Dept: PHYSICAL MEDICINE AND REHAB | Facility: CLINIC | Age: 79
End: 2024-03-29
Payer: MEDICARE

## 2024-04-02 ENCOUNTER — LAB (OUTPATIENT)
Dept: LAB | Facility: LAB | Age: 79
End: 2024-04-02
Payer: MEDICARE

## 2024-04-02 DIAGNOSIS — M20.41 OTHER HAMMER TOE(S) (ACQUIRED), RIGHT FOOT: Primary | ICD-10-CM

## 2024-04-02 DIAGNOSIS — M20.61 ACQUIRED DEFORMITIES OF TOE(S), UNSPECIFIED, RIGHT FOOT: ICD-10-CM

## 2024-04-02 LAB
25(OH)D3 SERPL-MCNC: 28 NG/ML (ref 30–100)
EST. AVERAGE GLUCOSE BLD GHB EST-MCNC: 117 MG/DL
HBA1C MFR BLD: 5.7 %

## 2024-04-02 PROCEDURE — 36415 COLL VENOUS BLD VENIPUNCTURE: CPT

## 2024-04-02 PROCEDURE — 82306 VITAMIN D 25 HYDROXY: CPT

## 2024-04-02 PROCEDURE — 83036 HEMOGLOBIN GLYCOSYLATED A1C: CPT

## 2024-05-06 RX ORDER — CHLORHEXIDINE GLUCONATE 40 MG/ML
SOLUTION TOPICAL DAILY
Qty: 118 ML | Refills: 0 | Status: SHIPPED | OUTPATIENT
Start: 2024-05-06 | End: 2024-05-11

## 2024-05-06 RX ORDER — CHLORHEXIDINE GLUCONATE ORAL RINSE 1.2 MG/ML
15 SOLUTION DENTAL DAILY
Qty: 30 ML | Refills: 0 | Status: SHIPPED | OUTPATIENT
Start: 2024-05-06 | End: 2024-05-08

## 2024-05-07 ENCOUNTER — APPOINTMENT (OUTPATIENT)
Dept: VASCULAR MEDICINE | Facility: HOSPITAL | Age: 79
End: 2024-05-07
Payer: MEDICARE

## 2024-05-08 ENCOUNTER — PRE-ADMISSION TESTING (OUTPATIENT)
Dept: PREADMISSION TESTING | Facility: HOSPITAL | Age: 79
End: 2024-05-08
Payer: MEDICARE

## 2024-05-08 ENCOUNTER — HOSPITAL ENCOUNTER (OUTPATIENT)
Dept: VASCULAR MEDICINE | Facility: HOSPITAL | Age: 79
Discharge: HOME | End: 2024-05-08
Payer: MEDICARE

## 2024-05-08 VITALS
TEMPERATURE: 97.2 F | SYSTOLIC BLOOD PRESSURE: 144 MMHG | OXYGEN SATURATION: 97 % | HEIGHT: 61 IN | DIASTOLIC BLOOD PRESSURE: 77 MMHG | HEART RATE: 76 BPM | WEIGHT: 130.07 LBS | RESPIRATION RATE: 18 BRPM | BODY MASS INDEX: 24.56 KG/M2

## 2024-05-08 DIAGNOSIS — Z01.818 PRE-OP TESTING: ICD-10-CM

## 2024-05-08 DIAGNOSIS — I73.9 PERIPHERAL VASCULAR DISEASE, UNSPECIFIED (CMS-HCC): ICD-10-CM

## 2024-05-08 DIAGNOSIS — Z01.818 PREOP TESTING: Primary | ICD-10-CM

## 2024-05-08 LAB
ANION GAP SERPL CALC-SCNC: 10 MMOL/L (ref 10–20)
BUN SERPL-MCNC: 13 MG/DL (ref 6–23)
CALCIUM SERPL-MCNC: 8.7 MG/DL (ref 8.6–10.3)
CHLORIDE SERPL-SCNC: 108 MMOL/L (ref 98–107)
CO2 SERPL-SCNC: 28 MMOL/L (ref 21–32)
CREAT SERPL-MCNC: 1.02 MG/DL (ref 0.5–1.05)
EGFRCR SERPLBLD CKD-EPI 2021: 57 ML/MIN/1.73M*2
GLUCOSE SERPL-MCNC: 98 MG/DL (ref 74–99)
HCT VFR BLD AUTO: 45 % (ref 36–46)
HGB BLD-MCNC: 14.2 G/DL (ref 12–16)
POTASSIUM SERPL-SCNC: 3.9 MMOL/L (ref 3.5–5.3)
SODIUM SERPL-SCNC: 142 MMOL/L (ref 136–145)

## 2024-05-08 PROCEDURE — 93923 UPR/LXTR ART STDY 3+ LVLS: CPT

## 2024-05-08 PROCEDURE — 99203 OFFICE O/P NEW LOW 30 MIN: CPT | Performed by: NURSE PRACTITIONER

## 2024-05-08 PROCEDURE — 36415 COLL VENOUS BLD VENIPUNCTURE: CPT

## 2024-05-08 PROCEDURE — 87081 CULTURE SCREEN ONLY: CPT | Mod: PARLAB | Performed by: NURSE PRACTITIONER

## 2024-05-08 PROCEDURE — 93010 ELECTROCARDIOGRAM REPORT: CPT | Performed by: INTERNAL MEDICINE

## 2024-05-08 PROCEDURE — 93923 UPR/LXTR ART STDY 3+ LVLS: CPT | Performed by: SURGERY

## 2024-05-08 PROCEDURE — 80048 BASIC METABOLIC PNL TOTAL CA: CPT | Performed by: NURSE PRACTITIONER

## 2024-05-08 PROCEDURE — 85014 HEMATOCRIT: CPT | Performed by: NURSE PRACTITIONER

## 2024-05-08 PROCEDURE — 93005 ELECTROCARDIOGRAM TRACING: CPT

## 2024-05-08 RX ORDER — CALCITRIOL 0.25 UG/1
0.25 CAPSULE ORAL DAILY
COMMUNITY

## 2024-05-08 ASSESSMENT — DUKE ACTIVITY SCORE INDEX (DASI)
CAN YOU DO LIGHT WORK AROUND THE HOUSE LIKE DUSTING OR WASHING DISHES: YES
CAN YOU WALK INDOORS, SUCH AS AROUND YOUR HOUSE: YES
CAN YOU PARTICIPATE IN MODERATE RECREATIONAL ACTIVITIES LIKE GOLF, BOWLING, DANCING, DOUBLES TENNIS OR THROWING A BASEBALL OR FOOTBALL: NO
DASI METS SCORE: 5.4
CAN YOU DO MODERATE WORK AROUND THE HOUSE LIKE VACUUMING, SWEEPING FLOORS OR CARRYING GROCERIES: YES
CAN YOU TAKE CARE OF YOURSELF (EAT, DRESS, BATHE, OR USE TOILET): YES
CAN YOU CLIMB A FLIGHT OF STAIRS OR WALK UP A HILL: YES
CAN YOU RUN A SHORT DISTANCE: NO
CAN YOU HAVE SEXUAL RELATIONS: YES
TOTAL_SCORE: 21.45
CAN YOU DO YARD WORK LIKE RAKING LEAVES, WEEDING OR PUSHING A MOWER: NO
CAN YOU DO HEAVY WORK AROUND THE HOUSE LIKE SCRUBBING FLOORS OR LIFTING AND MOVING HEAVY FURNITURE: NO
CAN YOU WALK A BLOCK OR TWO ON LEVEL GROUND: NO
CAN YOU PARTICIPATE IN STRENOUS SPORTS LIKE SWIMMING, SINGLES TENNIS, FOOTBALL, BASKETBALL, OR SKIING: NO

## 2024-05-08 NOTE — H&P (VIEW-ONLY)
"CPM/PAT Evaluation       Name: Lauren Perez (Lauren Perez)  /Age: 1945/78 y.o.     In-Person       Chief Complaint: Right foot pain    78 yr old female with c/o Right foot pain.  Reports ongoing progressive pain worsened by activity including walking, standing and shoe wear.  Pain is constant ache with intermittent throbbing, burning and soreness; \"hurts a great deal!\" Rating of 10/10 pain. Reports broken toe for past two years which contributes to the pain.  Reports pain is also causing difficulty driving as well.  Followed by podiatry, has tried medications, toe wraps, toe inserts, voltaren cream and shoe inserts with no lasting relief.  Reports not as active as desired d/t chronic back pain and foot pain.  Reports rare rescue inhaler use, adding hasn't need to use in several weeks.  Denies cardiac or respiratory symptoms.  Denies past issues with anesthesia.     [facility] ([facility] - last visit on 2024 - Patient reports PCP is aware of upcoming surgery.  Followed by cardiology ([facility]) - last visit 3/4/2024 as a routine 6 month visit.      Cardiology ([facility]) notified and aware of planned surgery.          Past Medical History:   Diagnosis Date    Encounter for screening for cardiovascular disorders 10/30/2019    Screening for heart disease       Past Surgical History:   Procedure Laterality Date    OTHER SURGICAL HISTORY  2022    Lumbar vertebral fusion       Patient  has no history on file for sexual activity.    Family History   Problem Relation Name Age of Onset    Heart attack Mother      Heart attack Father         Allergies   Allergen Reactions    Codeine Other     chest pain    Opioids - Morphine Analogues Unknown    Sulfamethoxazole-Trimethoprim Unknown    Ace Inhibitors Rash       Prior to Admission medications    Medication Sig Start Date End Date Taking? Authorizing Provider   amLODIPine (Norvasc) 10 mg tablet Take 1 tablet (10 mg) by mouth once " daily.    Historical Provider, MD   chlorhexidine (Hibiclens) 4 % external liquid Apply topically once daily for 5 days. Wash daily for 5 days prior to surgery with day 5 being morning of surgery. 5/6/24 5/11/24  REGINE Matta   chlorhexidine (Peridex) 0.12 % solution Use 15 mL in the mouth or throat once daily for 2 doses. Swish and Spit day before surgery and again morning on day of surgery. 5/6/24 5/8/24  REGINE Matta   clonazePAM (KlonoPIN) 0.5 mg tablet Take 1 tablet (0.5 mg) by mouth 2 times a day. 2/9/24   Historical Provider, MD   DULoxetine (Cymbalta) 30 mg DR capsule Take 1 capsule (30 mg) by mouth once daily. Do not crush or chew. 2/23/24 2/22/25  Jessica Dickerson MD   gabapentin (Neurontin) 300 mg capsule Take 1 capsule (300 mg) by mouth 3 times a day. 2/16/24 8/14/24  Theresa Mcfadden DO   mv-mn/om3/dha/epa/fish/lut/savanna (OCUVITE ADULT 50 PLUS ORAL) Take as directed 9/17/19   Historical Provider, MD   pantoprazole (ProtoNix) 40 mg EC tablet Take 1 tablet (40 mg) by mouth once daily. 6/9/16   Historical Provider, MD   ProAir HFA 90 mcg/actuation inhaler INHALE 2 PUFFS BY MOUTH AS NEEDED EVERY 6 HOURS 12/16/19   Historical Provider, MD   rosuvastatin (Crestor) 20 mg tablet Take 1 tablet (20 mg) by mouth once daily. 3/4/24 3/4/25  Dao Hdez, DO   traZODone (Desyrel) 50 mg tablet  3/30/23   Historical Provider, MD        Review of Systems    Constitutional: no fever, no chills and not feeling poorly.   Eyes: no eyesight problems.   ENT: no hearing loss, no nosebleeds and no sore throat.   Cardiovascular: no chest pain, no palpitations and no extremity edema.   Respiratory: no shortness of breath, no wheezing, no cough, +sob w/exertion.   Gastrointestinal: negative for abdominal pain, blood in stools or changes in bowel habits   Genitourinary: negative for dysuria, incontinence or changes in urinary habits   Musculoskeletal: see HPI   Integumentary: negative for  lesions, rash or itching.   Neurological: negative for confusion, dizziness, fainting or difficulty walking.   Psychiatric: not suicidal, no anxiety and no depression.   All other systems have been reviewed and are negative for complaint.     Physical Exam  Constitutional:       General: No acute distress.     Aox3, pleasant and cooperative, appropriate mood and eye contact   HENT:      Head: Normocephalic.      Mouth/Throat: Mucous membranes moist & pink  Eyes:      Vision grossly intact, PERRLA, corrective lenses in use   Neck:      No carotid bruit, no JVD  Cardiovascular:      RRR, S1S2, no murmurs, rubs or gallops  Pulmonary:      Symmetric chest expansion, CTA, Room Air  Abdominal:      Soft non-tender, BSx4   Skin:     Warm, dry & intact   Extremities:      No gross deformities; abnormal gait, wheeled walker in use   Neurological:      No focal deficit, Aox3, GUERRA x4  Psychiatric:      Pleasant & cooperative, appropriate affect    PAT AIRWAY:   Airway:     Mallampati::  II    TM distance::  <3 FB    Neck ROM::  Full   implants      Visit Vitals  /77   Pulse 76   Temp 36.2 °C (97.2 °F) (Temporal)   Resp 18       DASI Risk Score      Flowsheet Row Most Recent Value   DASI SCORE 21.45   METS Score (Will be calculated only when all the questions are answered) 5.4          Caprini DVT Assessment    No data to display       Modified Frailty Index    No data to display       CHADS2 Stroke Risk  Current as of 44 minutes ago        N/A 3 to 100%: High Risk   2 to < 3%: Medium Risk   0 to < 2%: Low Risk     Last Change: N/A          This score determines the patient's risk of having a stroke if the patient has atrial fibrillation.        This score is not applicable to this patient. Components are not calculated.          Revised Cardiac Risk Index    No data to display       Apfel Simplified Score    No data to display       Risk Analysis Index Results This Encounter    No data found in the last 1 encounters.          Assessment and Plan:     Followed by podiatry, Acquired hallux valgus of Right foot, Acquired hammer toe of Right foot    Right foot bunionectomy w/proximal phalanx osteotomy w/mini c-arm & Right 2nd toe hammertoe correction w/Dr Macdonald on 5/22/2024    Reviewed todays ecg - cardiology ([facility]) notified and aware of planned surgery

## 2024-05-08 NOTE — CPM/PAT H&P
"CPM/PAT Evaluation       Name: Lauren Perez (Lauren Preez)  /Age: 1945/78 y.o.     In-Person       Chief Complaint: Right foot pain    78 yr old female with c/o Right foot pain.  Reports ongoing progressive pain worsened by activity including walking, standing and shoe wear.  Pain is constant ache with intermittent throbbing, burning and soreness; \"hurts a great deal!\" Rating of 10/10 pain. Reports broken toe for past two years which contributes to the pain.  Reports pain is also causing difficulty driving as well.  Followed by podiatry, has tried medications, toe wraps, toe inserts, voltaren cream and shoe inserts with no lasting relief.  Reports not as active as desired d/t chronic back pain and foot pain.  Reports rare rescue inhaler use, adding hasn't need to use in several weeks.  Denies cardiac or respiratory symptoms.  Denies past issues with anesthesia.     [facility] ([facility] - last visit on 2024 - Patient reports PCP is aware of upcoming surgery.  Followed by cardiology ([facility]) - last visit 3/4/2024 as a routine 6 month visit.      Cardiology ([facility]) notified and aware of planned surgery.          Past Medical History:   Diagnosis Date    Encounter for screening for cardiovascular disorders 10/30/2019    Screening for heart disease       Past Surgical History:   Procedure Laterality Date    OTHER SURGICAL HISTORY  2022    Lumbar vertebral fusion       Patient  has no history on file for sexual activity.    Family History   Problem Relation Name Age of Onset    Heart attack Mother      Heart attack Father         Allergies   Allergen Reactions    Codeine Other     chest pain    Opioids - Morphine Analogues Unknown    Sulfamethoxazole-Trimethoprim Unknown    Ace Inhibitors Rash       Prior to Admission medications    Medication Sig Start Date End Date Taking? Authorizing Provider   amLODIPine (Norvasc) 10 mg tablet Take 1 tablet (10 mg) by mouth once " daily.    Historical Provider, MD   chlorhexidine (Hibiclens) 4 % external liquid Apply topically once daily for 5 days. Wash daily for 5 days prior to surgery with day 5 being morning of surgery. 5/6/24 5/11/24  REGINE Matta   chlorhexidine (Peridex) 0.12 % solution Use 15 mL in the mouth or throat once daily for 2 doses. Swish and Spit day before surgery and again morning on day of surgery. 5/6/24 5/8/24  REGINE Matta   clonazePAM (KlonoPIN) 0.5 mg tablet Take 1 tablet (0.5 mg) by mouth 2 times a day. 2/9/24   Historical Provider, MD   DULoxetine (Cymbalta) 30 mg DR capsule Take 1 capsule (30 mg) by mouth once daily. Do not crush or chew. 2/23/24 2/22/25  Jessica Dickerson MD   gabapentin (Neurontin) 300 mg capsule Take 1 capsule (300 mg) by mouth 3 times a day. 2/16/24 8/14/24  Theresa Mcfadden DO   mv-mn/om3/dha/epa/fish/lut/savanna (OCUVITE ADULT 50 PLUS ORAL) Take as directed 9/17/19   Historical Provider, MD   pantoprazole (ProtoNix) 40 mg EC tablet Take 1 tablet (40 mg) by mouth once daily. 6/9/16   Historical Provider, MD   ProAir HFA 90 mcg/actuation inhaler INHALE 2 PUFFS BY MOUTH AS NEEDED EVERY 6 HOURS 12/16/19   Historical Provider, MD   rosuvastatin (Crestor) 20 mg tablet Take 1 tablet (20 mg) by mouth once daily. 3/4/24 3/4/25  Dao Hdez, DO   traZODone (Desyrel) 50 mg tablet  3/30/23   Historical Provider, MD        Review of Systems    Constitutional: no fever, no chills and not feeling poorly.   Eyes: no eyesight problems.   ENT: no hearing loss, no nosebleeds and no sore throat.   Cardiovascular: no chest pain, no palpitations and no extremity edema.   Respiratory: no shortness of breath, no wheezing, no cough, +sob w/exertion.   Gastrointestinal: negative for abdominal pain, blood in stools or changes in bowel habits   Genitourinary: negative for dysuria, incontinence or changes in urinary habits   Musculoskeletal: see HPI   Integumentary: negative for  lesions, rash or itching.   Neurological: negative for confusion, dizziness, fainting or difficulty walking.   Psychiatric: not suicidal, no anxiety and no depression.   All other systems have been reviewed and are negative for complaint.     Physical Exam  Constitutional:       General: No acute distress.     Aox3, pleasant and cooperative, appropriate mood and eye contact   HENT:      Head: Normocephalic.      Mouth/Throat: Mucous membranes moist & pink  Eyes:      Vision grossly intact, PERRLA, corrective lenses in use   Neck:      No carotid bruit, no JVD  Cardiovascular:      RRR, S1S2, no murmurs, rubs or gallops  Pulmonary:      Symmetric chest expansion, CTA, Room Air  Abdominal:      Soft non-tender, BSx4   Skin:     Warm, dry & intact   Extremities:      No gross deformities; abnormal gait, wheeled walker in use   Neurological:      No focal deficit, Aox3, GUERRA x4  Psychiatric:      Pleasant & cooperative, appropriate affect    PAT AIRWAY:   Airway:     Mallampati::  II    TM distance::  <3 FB    Neck ROM::  Full   implants      Visit Vitals  /77   Pulse 76   Temp 36.2 °C (97.2 °F) (Temporal)   Resp 18       DASI Risk Score      Flowsheet Row Most Recent Value   DASI SCORE 21.45   METS Score (Will be calculated only when all the questions are answered) 5.4          Caprini DVT Assessment    No data to display       Modified Frailty Index    No data to display       CHADS2 Stroke Risk  Current as of 44 minutes ago        N/A 3 to 100%: High Risk   2 to < 3%: Medium Risk   0 to < 2%: Low Risk     Last Change: N/A          This score determines the patient's risk of having a stroke if the patient has atrial fibrillation.        This score is not applicable to this patient. Components are not calculated.          Revised Cardiac Risk Index    No data to display       Apfel Simplified Score    No data to display       Risk Analysis Index Results This Encounter    No data found in the last 1 encounters.          Assessment and Plan:     Followed by podiatry, Acquired hallux valgus of Right foot, Acquired hammer toe of Right foot    Right foot bunionectomy w/proximal phalanx osteotomy w/mini c-arm & Right 2nd toe hammertoe correction w/Dr Macdonald on 5/22/2024    Reviewed todays ecg - cardiology ([facility]) notified and aware of planned surgery

## 2024-05-08 NOTE — PREPROCEDURE INSTRUCTIONS
Medication List            Accurate as of May 8, 2024 10:44 AM. Always use your most recent med list.                amLODIPine 10 mg tablet  Commonly known as: Norvasc  Medication Adjustments for Surgery: Take morning of surgery with sip of water, no other fluids     calcitriol 0.25 mcg capsule  Commonly known as: Rocaltrol  Medication Adjustments for Surgery: Stop 7 days before surgery     * chlorhexidine 0.12 % solution  Commonly known as: Peridex  Use 15 mL in the mouth or throat once daily for 2 doses. Swish and Spit day before surgery and again morning on day of surgery.     * chlorhexidine 4 % external liquid  Commonly known as: Hibiclens  Apply topically once daily for 5 days. Wash daily for 5 days prior to surgery with day 5 being morning of surgery.     clonazePAM 0.5 mg tablet  Commonly known as: KlonoPIN  Medication Adjustments for Surgery: Other (Comment)  Notes to patient: Continue as prescribed     gabapentin 300 mg capsule  Commonly known as: Neurontin  Take 1 capsule (300 mg) by mouth 3 times a day.  Medication Adjustments for Surgery: Take morning of surgery with sip of water, no other fluids     OCUVITE ADULT 50 PLUS ORAL  Medication Adjustments for Surgery: Stop 7 days before surgery     pantoprazole 40 mg EC tablet  Commonly known as: ProtoNix  Medication Adjustments for Surgery: Take morning of surgery with sip of water, no other fluids     ProAir HFA 90 mcg/actuation inhaler  Generic drug: albuterol  Medication Adjustments for Surgery: Other (Comment)  Notes to patient: Continue as prescribed     rosuvastatin 20 mg tablet  Commonly known as: Crestor  Take 1 tablet (20 mg) by mouth once daily.  Medication Adjustments for Surgery: Take morning of surgery with sip of water, no other fluids     traZODone 50 mg tablet  Commonly known as: Desyrel  Medication Adjustments for Surgery: Other (Comment)  Notes to patient: Continue as prescribed           * This list has 2 medication(s) that are the  same as other medications prescribed for you. Read the directions carefully, and ask your doctor or other care provider to review them with you.                PRE-OPERATIVE INSTRUCTIONS FOR SURGERY    *Do not eat anything after midnight the night of surgery.  This includes food of any kind (including hard candy, cough drops, mints).   You may have up to 13.5 ounces of clear liquid  until TWO hours prior to your arrival time to the hospital.  This includes water, black tea/coffee, (no milk or cream) apple juice and electrolyte drinks (GATORADE).  You may chew gum until TWO hours prior you your surgery/procedure.         *One of our staff members will call you ONE business day before your surgery, between 11 am-2 pm to let you know the time to arrive.  If you have not received a call by 2 pm, call [phone]  *When you arrive at the hospital-->GO TO Registration on the ground floor  *Stop smoking 24 hours prior to surgery.  No Marijuana, CBD Oil or Vaping for 48 hours  *No alcohol 24 hours prior to surgery  *You will need a responsible adult to drive you home  -No acrylic nails or nail polish on at least one fingernail, NO polish on toes for foot surgery  -You may be asked to remove your dentures, partial plate, eyeglasses or contact lenses before going to surgery.  Please bring a case for these items.  -Body piercings need to be removed.  Jewelry and valuables should be left at home.  -Put on loose,  comfortable, clean clothing, that will accommodate bandages        What is a home antibacterial shower?  START 5/18  This shower is a way of cleaning the skin with a germ killing solution before surgery.  The solution contains chlorhexidine, commonly known as CHG.  CHG is a skin cleanser with germ killing ability.  Let your doctor know if you are allergic to chlorhexidine.    Why do I need to take a preoperative antibacterial shower?  Skin is not sterile.  It is best to try to make your skin as free of germs as  possible before surgery.  Proper cleansing with a germ killing soap before surgery can lower the number of germs on your skin.  This helps to reduce the risk of infection at the surgical site.  Following the instructions listed below will help you prepare your skin for surgery.      How do I use the solution?    Steps: Begin using your CHG soap 5 days before your surgery on __5/22/2024________________.    *First, wash and rinse your hair using the CHG soap.  Keep CHG soap away from ear canals and eyes.   Rinse completely, do not condition.  Hair extensions should be removed.    *Wash your face with your normal soap and rinse.   *Apply the CHG solution to a clean wet washcloth.  Turn the water off or move away from the water spray to avoid premature rinsing of the CHG soap as you are applying.  Firmly lather your entire body from the neck down.  Do not use on your face.    *Pay special attention to the area(s) where your incision(s) will be located unless they are on your face.  Avoid scrubbing your skin too hard.  The important part is to have the CHG soap sit on your skin for 3 minutes.   *When the 3 minutes are up, turn on the water and rinse the CHG solution off your body completely.  *Do not wash with regular soap after you  have  used the CHG soap solution.  *Pat  yourself dry with a clean, freshly laundered towel.  *Do not apply powders, deodorants or lotions.  *Dress in clean freshly laundered night clothes.    *Be sure to sleep with clean freshly laundered sheets.    *Be aware the CHG will cause stains on fabrics; if you wash them with bleach after use.  Rinse your washcloth and other linens that have contact with CHG completely.  Use only non-chlorine detergents to launder the items  used.  *The morning of surgery is the fifth day.  Repeat the above steps and dress in clean comfortable clothing.     What is oral/dental rinse?  It is mouthwash.  It is a way of cleaning the he mouth with a germ-killing  solution before your surgery.  The solution contains chlorhexidine, commonly known as CHG.  It is used inside the mouth to kill a bacteria known as Staphylococcus aureus.  Let your doctor know if you are allergic to Chlorhexidine.    Why do I need to use CHG oral/ dental rinse?  The CHG oral/dental rinse helps to kill bacteria in your mouth know as Staphylococcus aureus.  This reduces the risk of infection at the surgical site.    Using your CHG oral/dental rinse    STEPS:    Use your CHG oral/dental rinse after you brush your teeth the night before (at bedtime) and the morning of your surgery.  Follow all the directions on your prescription label.  *Use the cap on the container to measure 15 ml  *Swish (gargle if you can) the mouthwash in your mouth for at least 30 seconds, (do not swallow) and spit out  *After you use your CHG rinse, do not rinse your mouth with water, drink or eat.  Please refer to the prescription label for the appropriate time to resume oral intake.    What side effects might I have using the CHG oral/dental rinse?  CHG rinse will stick you plaque on the teeth.  Brush and floss just before use.   Teeth brushing will help avoid staining of the plaque during  use.  Teeth brushing will help avoid staining of plaque during  use.    Who should I contact if I have questions about the CHG oral/dental rinse and or CHG soap?  Please call [facility], Pre-Admission testing at [phone] if you have any questions.    What you may be asked to bring to surgery:  ___Crutches, walker  ___CPAP machine  ___Urine specimen

## 2024-05-10 LAB
ATRIAL RATE: 81 BPM
Q ONSET: 225 MS
QRS COUNT: 13 BEATS
QRS DURATION: 74 MS
QT INTERVAL: 384 MS
QTC CALCULATION(BAZETT): 446 MS
QTC FREDERICIA: 424 MS
R AXIS: 49 DEGREES
STAPHYLOCOCCUS SPEC CULT: NORMAL
T AXIS: 29 DEGREES
T OFFSET: 417 MS
VENTRICULAR RATE: 81 BPM

## 2024-05-22 ENCOUNTER — ANESTHESIA (OUTPATIENT)
Dept: OPERATING ROOM | Facility: HOSPITAL | Age: 79
End: 2024-05-22
Payer: MEDICARE

## 2024-05-22 ENCOUNTER — ANESTHESIA EVENT (OUTPATIENT)
Dept: OPERATING ROOM | Facility: HOSPITAL | Age: 79
End: 2024-05-22
Payer: MEDICARE

## 2024-05-22 ENCOUNTER — HOSPITAL ENCOUNTER (OUTPATIENT)
Facility: HOSPITAL | Age: 79
Setting detail: OUTPATIENT SURGERY
Discharge: HOME | End: 2024-05-22
Attending: PODIATRIST | Admitting: PODIATRIST
Payer: MEDICARE

## 2024-05-22 VITALS
RESPIRATION RATE: 18 BRPM | BODY MASS INDEX: 24.56 KG/M2 | HEART RATE: 80 BPM | HEIGHT: 61 IN | WEIGHT: 130.07 LBS | TEMPERATURE: 97 F | DIASTOLIC BLOOD PRESSURE: 58 MMHG | SYSTOLIC BLOOD PRESSURE: 126 MMHG | OXYGEN SATURATION: 97 %

## 2024-05-22 DIAGNOSIS — Z01.818 PRE-OP TESTING: Primary | ICD-10-CM

## 2024-05-22 DIAGNOSIS — M20.12 ACQUIRED HALLUX VALGUS, LEFT: ICD-10-CM

## 2024-05-22 DIAGNOSIS — Z98.890 STATUS POST RIGHT FOOT SURGERY: ICD-10-CM

## 2024-05-22 PROCEDURE — 3600000003 HC OR TIME - INITIAL BASE CHARGE - PROCEDURE LEVEL THREE: Performed by: PODIATRIST

## 2024-05-22 PROCEDURE — A28298: Performed by: NURSE ANESTHETIST, CERTIFIED REGISTERED

## 2024-05-22 PROCEDURE — 2500000004 HC RX 250 GENERAL PHARMACY W/ HCPCS (ALT 636 FOR OP/ED): Performed by: PODIATRIST

## 2024-05-22 PROCEDURE — 3700000002 HC GENERAL ANESTHESIA TIME - EACH INCREMENTAL 1 MINUTE: Performed by: PODIATRIST

## 2024-05-22 PROCEDURE — C1713 ANCHOR/SCREW BN/BN,TIS/BN: HCPCS | Performed by: PODIATRIST

## 2024-05-22 PROCEDURE — A28298: Performed by: ANESTHESIOLOGY

## 2024-05-22 PROCEDURE — 3600000008 HC OR TIME - EACH INCREMENTAL 1 MINUTE - PROCEDURE LEVEL THREE: Performed by: PODIATRIST

## 2024-05-22 PROCEDURE — 3700000001 HC GENERAL ANESTHESIA TIME - INITIAL BASE CHARGE: Performed by: PODIATRIST

## 2024-05-22 PROCEDURE — 7100000002 HC RECOVERY ROOM TIME - EACH INCREMENTAL 1 MINUTE: Performed by: PODIATRIST

## 2024-05-22 PROCEDURE — 2500000004 HC RX 250 GENERAL PHARMACY W/ HCPCS (ALT 636 FOR OP/ED): Performed by: NURSE ANESTHETIST, CERTIFIED REGISTERED

## 2024-05-22 PROCEDURE — 7100000009 HC PHASE TWO TIME - INITIAL BASE CHARGE: Performed by: PODIATRIST

## 2024-05-22 PROCEDURE — 2780000003 HC OR 278 NO HCPCS: Performed by: PODIATRIST

## 2024-05-22 PROCEDURE — 2500000004 HC RX 250 GENERAL PHARMACY W/ HCPCS (ALT 636 FOR OP/ED): Mod: JZ | Performed by: PODIATRIST

## 2024-05-22 PROCEDURE — 7100000010 HC PHASE TWO TIME - EACH INCREMENTAL 1 MINUTE: Performed by: PODIATRIST

## 2024-05-22 PROCEDURE — 99100 ANES PT EXTEME AGE<1 YR&>70: CPT | Performed by: ANESTHESIOLOGY

## 2024-05-22 PROCEDURE — 2500000005 HC RX 250 GENERAL PHARMACY W/O HCPCS: Performed by: NURSE ANESTHETIST, CERTIFIED REGISTERED

## 2024-05-22 PROCEDURE — 2500000004 HC RX 250 GENERAL PHARMACY W/ HCPCS (ALT 636 FOR OP/ED): Performed by: ANESTHESIOLOGY

## 2024-05-22 PROCEDURE — 2500000005 HC RX 250 GENERAL PHARMACY W/O HCPCS: Performed by: PODIATRIST

## 2024-05-22 PROCEDURE — 7100000001 HC RECOVERY ROOM TIME - INITIAL BASE CHARGE: Performed by: PODIATRIST

## 2024-05-22 PROCEDURE — 2720000007 HC OR 272 NO HCPCS: Performed by: PODIATRIST

## 2024-05-22 DEVICE — IMPLANTABLE DEVICE: Type: IMPLANTABLE DEVICE | Site: FOOT | Status: FUNCTIONAL

## 2024-05-22 RX ORDER — MIDAZOLAM HYDROCHLORIDE 1 MG/ML
INJECTION, SOLUTION INTRAMUSCULAR; INTRAVENOUS AS NEEDED
Status: DISCONTINUED | OUTPATIENT
Start: 2024-05-22 | End: 2024-05-22

## 2024-05-22 RX ORDER — MEPERIDINE HYDROCHLORIDE 25 MG/ML
12.5 INJECTION INTRAMUSCULAR; INTRAVENOUS; SUBCUTANEOUS EVERY 10 MIN PRN
Status: DISCONTINUED | OUTPATIENT
Start: 2024-05-22 | End: 2024-05-22 | Stop reason: HOSPADM

## 2024-05-22 RX ORDER — LABETALOL HYDROCHLORIDE 5 MG/ML
5 INJECTION, SOLUTION INTRAVENOUS ONCE AS NEEDED
Status: DISCONTINUED | OUTPATIENT
Start: 2024-05-22 | End: 2024-05-22 | Stop reason: HOSPADM

## 2024-05-22 RX ORDER — NORETHINDRONE AND ETHINYL ESTRADIOL 0.5-0.035
KIT ORAL AS NEEDED
Status: DISCONTINUED | OUTPATIENT
Start: 2024-05-22 | End: 2024-05-22

## 2024-05-22 RX ORDER — FENTANYL CITRATE 50 UG/ML
INJECTION, SOLUTION INTRAMUSCULAR; INTRAVENOUS AS NEEDED
Status: DISCONTINUED | OUTPATIENT
Start: 2024-05-22 | End: 2024-05-22

## 2024-05-22 RX ORDER — PROPOFOL 10 MG/ML
INJECTION, EMULSION INTRAVENOUS AS NEEDED
Status: DISCONTINUED | OUTPATIENT
Start: 2024-05-22 | End: 2024-05-22

## 2024-05-22 RX ORDER — ACETAMINOPHEN 325 MG/1
650 TABLET ORAL EVERY 4 HOURS PRN
Status: DISCONTINUED | OUTPATIENT
Start: 2024-05-22 | End: 2024-05-22 | Stop reason: HOSPADM

## 2024-05-22 RX ORDER — HYDRALAZINE HYDROCHLORIDE 20 MG/ML
5 INJECTION INTRAMUSCULAR; INTRAVENOUS EVERY 30 MIN PRN
Status: DISCONTINUED | OUTPATIENT
Start: 2024-05-22 | End: 2024-05-22 | Stop reason: HOSPADM

## 2024-05-22 RX ORDER — LIDOCAINE HYDROCHLORIDE 20 MG/ML
INJECTION, SOLUTION INFILTRATION; PERINEURAL AS NEEDED
Status: DISCONTINUED | OUTPATIENT
Start: 2024-05-22 | End: 2024-05-22 | Stop reason: HOSPADM

## 2024-05-22 RX ORDER — PHENYLEPHRINE HCL IN 0.9% NACL 1 MG/10 ML
SYRINGE (ML) INTRAVENOUS AS NEEDED
Status: DISCONTINUED | OUTPATIENT
Start: 2024-05-22 | End: 2024-05-22

## 2024-05-22 RX ORDER — ONDANSETRON HYDROCHLORIDE 2 MG/ML
4 INJECTION, SOLUTION INTRAVENOUS ONCE AS NEEDED
Status: DISCONTINUED | OUTPATIENT
Start: 2024-05-22 | End: 2024-05-22 | Stop reason: HOSPADM

## 2024-05-22 RX ORDER — KETOROLAC TROMETHAMINE 30 MG/ML
INJECTION, SOLUTION INTRAMUSCULAR; INTRAVENOUS AS NEEDED
Status: DISCONTINUED | OUTPATIENT
Start: 2024-05-22 | End: 2024-05-22

## 2024-05-22 RX ORDER — ONDANSETRON HYDROCHLORIDE 2 MG/ML
INJECTION, SOLUTION INTRAVENOUS AS NEEDED
Status: DISCONTINUED | OUTPATIENT
Start: 2024-05-22 | End: 2024-05-22

## 2024-05-22 RX ORDER — LIDOCAINE HCL/PF 100 MG/5ML
SYRINGE (ML) INTRAVENOUS AS NEEDED
Status: DISCONTINUED | OUTPATIENT
Start: 2024-05-22 | End: 2024-05-22

## 2024-05-22 RX ORDER — OXYCODONE AND ACETAMINOPHEN 5; 325 MG/1; MG/1
1 TABLET ORAL EVERY 6 HOURS PRN
Qty: 28 TABLET | Refills: 0 | Status: SHIPPED | OUTPATIENT
Start: 2024-05-22 | End: 2024-05-22

## 2024-05-22 RX ORDER — CEFAZOLIN SODIUM 2 G/100ML
2 INJECTION, SOLUTION INTRAVENOUS ONCE
Status: COMPLETED | OUTPATIENT
Start: 2024-05-22 | End: 2024-05-22

## 2024-05-22 RX ORDER — MIDAZOLAM HYDROCHLORIDE 1 MG/ML
1 INJECTION, SOLUTION INTRAMUSCULAR; INTRAVENOUS ONCE AS NEEDED
Status: DISCONTINUED | OUTPATIENT
Start: 2024-05-22 | End: 2024-05-22 | Stop reason: HOSPADM

## 2024-05-22 RX ORDER — OXYCODONE AND ACETAMINOPHEN 5; 325 MG/1; MG/1
1 TABLET ORAL EVERY 6 HOURS PRN
Qty: 28 TABLET | Refills: 0 | Status: SHIPPED | OUTPATIENT
Start: 2024-05-22

## 2024-05-22 RX ORDER — BUPIVACAINE HYDROCHLORIDE 5 MG/ML
INJECTION, SOLUTION PERINEURAL AS NEEDED
Status: DISCONTINUED | OUTPATIENT
Start: 2024-05-22 | End: 2024-05-22 | Stop reason: HOSPADM

## 2024-05-22 RX ORDER — SODIUM CHLORIDE, SODIUM LACTATE, POTASSIUM CHLORIDE, CALCIUM CHLORIDE 600; 310; 30; 20 MG/100ML; MG/100ML; MG/100ML; MG/100ML
100 INJECTION, SOLUTION INTRAVENOUS CONTINUOUS
Status: DISCONTINUED | OUTPATIENT
Start: 2024-05-22 | End: 2024-05-22 | Stop reason: HOSPADM

## 2024-05-22 RX ORDER — DIPHENHYDRAMINE HYDROCHLORIDE 50 MG/ML
12.5 INJECTION INTRAMUSCULAR; INTRAVENOUS ONCE AS NEEDED
Status: DISCONTINUED | OUTPATIENT
Start: 2024-05-22 | End: 2024-05-22 | Stop reason: HOSPADM

## 2024-05-22 RX ORDER — HYDROMORPHONE HYDROCHLORIDE 1 MG/ML
1 INJECTION, SOLUTION INTRAMUSCULAR; INTRAVENOUS; SUBCUTANEOUS EVERY 5 MIN PRN
Status: DISCONTINUED | OUTPATIENT
Start: 2024-05-22 | End: 2024-05-22 | Stop reason: HOSPADM

## 2024-05-22 RX ADMIN — MEPERIDINE HYDROCHLORIDE 12.5 MG: 25 INJECTION INTRAMUSCULAR; INTRAVENOUS; SUBCUTANEOUS at 09:29

## 2024-05-22 RX ADMIN — Medication 100 MCG: at 07:55

## 2024-05-22 RX ADMIN — Medication 200 MCG: at 08:28

## 2024-05-22 RX ADMIN — SODIUM CHLORIDE, POTASSIUM CHLORIDE, SODIUM LACTATE AND CALCIUM CHLORIDE 100 ML/HR: 600; 310; 30; 20 INJECTION, SOLUTION INTRAVENOUS at 06:28

## 2024-05-22 RX ADMIN — DEXAMETHASONE SODIUM PHOSPHATE 4 MG: 4 INJECTION, SOLUTION INTRAMUSCULAR; INTRAVENOUS at 07:58

## 2024-05-22 RX ADMIN — MEPERIDINE HYDROCHLORIDE 12.5 MG: 25 INJECTION INTRAMUSCULAR; INTRAVENOUS; SUBCUTANEOUS at 09:17

## 2024-05-22 RX ADMIN — MIDAZOLAM 2 MG: 1 INJECTION INTRAMUSCULAR; INTRAVENOUS at 07:35

## 2024-05-22 RX ADMIN — EPHEDRINE SULFATE 10 MG: 50 INJECTION, SOLUTION INTRAVENOUS at 08:43

## 2024-05-22 RX ADMIN — Medication 400 MCG: at 08:40

## 2024-05-22 RX ADMIN — Medication 100 MCG: at 07:58

## 2024-05-22 RX ADMIN — CEFAZOLIN SODIUM 2 G: 2 INJECTION, SOLUTION INTRAVENOUS at 07:45

## 2024-05-22 RX ADMIN — LIDOCAINE HYDROCHLORIDE 50 MG: 20 INJECTION INTRAVENOUS at 07:38

## 2024-05-22 RX ADMIN — KETOROLAC TROMETHAMINE 15 MG: 30 INJECTION, SOLUTION INTRAMUSCULAR at 08:45

## 2024-05-22 RX ADMIN — EPHEDRINE SULFATE 10 MG: 50 INJECTION, SOLUTION INTRAVENOUS at 08:11

## 2024-05-22 RX ADMIN — ONDANSETRON 4 MG: 2 INJECTION INTRAMUSCULAR; INTRAVENOUS at 07:58

## 2024-05-22 RX ADMIN — FENTANYL CITRATE 50 MCG: 50 INJECTION, SOLUTION INTRAMUSCULAR; INTRAVENOUS at 07:38

## 2024-05-22 RX ADMIN — Medication 200 MCG: at 08:01

## 2024-05-22 RX ADMIN — PROPOFOL 200 MG: 10 INJECTION, EMULSION INTRAVENOUS at 07:38

## 2024-05-22 SDOH — HEALTH STABILITY: MENTAL HEALTH: CURRENT SMOKER: 0

## 2024-05-22 ASSESSMENT — COLUMBIA-SUICIDE SEVERITY RATING SCALE - C-SSRS
6. HAVE YOU EVER DONE ANYTHING, STARTED TO DO ANYTHING, OR PREPARED TO DO ANYTHING TO END YOUR LIFE?: NO
1. IN THE PAST MONTH, HAVE YOU WISHED YOU WERE DEAD OR WISHED YOU COULD GO TO SLEEP AND NOT WAKE UP?: NO
2. HAVE YOU ACTUALLY HAD ANY THOUGHTS OF KILLING YOURSELF?: NO

## 2024-05-22 ASSESSMENT — PAIN SCALES - GENERAL
PAIN_LEVEL: 0
PAINLEVEL_OUTOF10: 0 - NO PAIN

## 2024-05-22 ASSESSMENT — PAIN - FUNCTIONAL ASSESSMENT: PAIN_FUNCTIONAL_ASSESSMENT: 0-10

## 2024-05-22 NOTE — ANESTHESIA PROCEDURE NOTES
Airway  Date/Time: 5/22/2024 7:41 AM  Urgency: elective    Airway not difficult    Staffing  Performed: CRNA   Authorized by: Chu Zazueta MD    Performed by: BETTY Cabrera  Patient location during procedure: OR    Indications and Patient Condition  Indications for airway management: anesthesia  Spontaneous ventilation: present  Sedation level: deep  Preoxygenated: yes  Patient position: sniffing  Mask difficulty assessment: 1 - vent by mask    Final Airway Details  Final airway type: supraglottic airway      Successful airway: Size 3     Number of attempts at approach: 1  Ventilation between attempts: none  Number of other approaches attempted: 0

## 2024-05-22 NOTE — ANESTHESIA POSTPROCEDURE EVALUATION
Patient: Lauren Perez    Procedure Summary       Date: 05/22/24 Room / Location: [facility] / [facility]    Anesthesia Start: 0734 Anesthesia Stop:     Procedures:       RIGHT FOOT BUNIONECTOMY WITH PROXIMAL PHALANX OSTEOTOMY & MINI C-ARM (Right)      & RIGHT 2nd TOE HAMMERTOE CORRECTION  **ARTHREX, 1 LOANER** (Right) Diagnosis:       Acquired hallux valgus of right foot      Acquired hammer toe of right foot      (Acquired hallux valgus of right foot [M20.11])      (Acquired hammer toe of right foot [M20.41])    Surgeons: Gwen Macdonald DPM Responsible Provider: Chu Zazueta MD    Anesthesia Type: general ASA Status: 2            Anesthesia Type: general    Vitals Value Taken Time   /61 05/22/24 0905   Temp 36.1 05/22/24 0905   Pulse 83 05/22/24 0905   Resp 18 05/22/24 0905   SpO2 100 05/22/24 0905       Anesthesia Post Evaluation    Patient location during evaluation: PACU  Patient participation: complete - patient participated  Level of consciousness: awake and alert  Pain score: 0  Pain management: adequate  Airway patency: patent  Cardiovascular status: acceptable  Respiratory status: acceptable, nasal cannula, spontaneous ventilation and nonlabored ventilation  Hydration status: acceptable  Postoperative Nausea and Vomiting: none        There were no known notable events for this encounter.

## 2024-05-22 NOTE — ANESTHESIA PREPROCEDURE EVALUATION
Patient: Lauren Perez    Procedure Information       Date/Time: 05/22/24 9830    Procedures:       RIGHT FOOT BUNIONECTOMY WITH PROXIMAL PHALANX OSTEOTOMY & MINI C-ARM (Right)      & RIGHT 2nd TOE HAMMERTOE CORRECTION  **ARTHREX, 1 LOANER** (Right)    Location: [facility] / [facility]    Surgeons: Gwen Macdonald DPM            Relevant Problems   Anesthesia (within normal limits)      Cardiac   (+) Benign essential hypertension   (+) Hyperlipidemia   (+) Hypertension   (+) Mitral regurgitation   (+) PAC (premature atrial contraction)      Pulmonary   (+) SOB (shortness of breath) on exertion      Neuro   (+) Anxiety and depression      GI   (+) GERD (gastroesophageal reflux disease)      Endocrine   (+) Controlled type 2 diabetes mellitus without complication (Multi)      Musculoskeletal   (+) Chronic pain syndrome   (+) Chronic right-sided low back pain without sciatica   (+) Fibromyalgia   (+) Herniated lumbar disc without myelopathy   (+) Lumbar spondylosis   (+) Lumbar stenosis   (+) Primary osteoarthritis of both knees      HEENT   (+) Presbycusis, bilateral       Clinical information reviewed:   Tobacco  Allergies  Meds   Med Hx  Surg Hx   Fam Hx  Soc Hx        NPO Detail:  NPO/Void Status  Date of Last Liquid: 05/22/24  Time of Last Liquid: 0000  Date of Last Solid: 05/22/24  Time of Last Solid: 0000         Physical Exam    Airway  Mallampati: II  TM distance: >3 FB  Neck ROM: full     Cardiovascular - normal exam  Rhythm: regular  Rate: normal     Dental - normal exam     Pulmonary - normal exam     Abdominal            Anesthesia Plan    History of general anesthesia?: yes  History of complications of general anesthesia?: no    ASA 2     general     The patient is not a current smoker.    intravenous induction   Postoperative administration of opioids is intended.  Trial extubation is planned.  Anesthetic plan and risks discussed with patient.  Use of blood products discussed with patient who  consented to blood products.    Plan discussed with CRNA.

## 2024-05-22 NOTE — BRIEF OP NOTE
Date: 2024  OR Location: [facility]    Name: Lauren Perez, : 1945, Age: 78 y.o., MRN: [id number], Sex: female    Diagnosis  Pre-op Diagnosis     * Acquired hallux valgus of right foot [M20.11]     * Acquired hammer toe of right foot [M20.41] Post-op Diagnosis     * Acquired hallux valgus of right foot [M20.11]     * Acquired hammer toe of right foot [M20.41]     Procedures  RIGHT FOOT BUNIONECTOMY WITH PROXIMAL PHALANX OSTEOTOMY & MINI C-ARM  09929 - MD CORRJ HLX VLGS BNCTY SESMDC PROX PHLX OSTEOT    & RIGHT 2nd TOE HAMMERTOE CORRECTION  **ARTHREX, 1 LOANER**  26986 - MD CORRECTION HAMMERTOE    Surgeons      * Gwen Macdonald - Primary    Resident/Fellow/Other Assistant:  Surgeons and Role:     * Renee Pereira DPM - Resident - Assisting    Procedure Summary  Anesthesia: General  ASA: II  Anesthesia Staff: Anesthesiologist: Chu Zazueta MD  CRNA: SHERON Cabrera-CRNA    Estimated Blood Loss: <10 mL  Intra-op Medications:   Administrations occurring from 0730 to 0935 on 24:   Medication Name Total Dose   lidocaine (Xylocaine) 20 mg/mL (2 %) injection 6.5 mL   BUPivacaine HCl (Marcaine) 0.5 % (5 mg/mL) injection 6.5 mL   ceFAZolin in dextrose (iso-os) (Ancef) IVPB 2 g 2 g          Anesthesia Record               Intraprocedure I/O Totals          Output    Est. Blood Loss 10 mL    Total Output 10 mL          Specimen: No specimens collected     Staff:   Circulator: Fatou Sánchez Person: Mony    Findings: Intraoperative findings consistent with clinical and radiographic findings. Degenerative changes to left first MTPJ and 2nd PIPJ.     Complications:  None; patient tolerated the procedure well.     Disposition: PACU - hemodynamically stable.  Condition: stable  Specimens Collected: No specimens collected  Attending Attestation: I was present and scrubbed for the entire procedure.    Gwen Macdonald  Phone Number: [phone]

## 2024-05-22 NOTE — DISCHARGE INSTRUCTIONS
PODIATRIC SURGERY POST-OP INSTRUCTIONS    YOU HAD ANESTHESIA:  You must have a responsible adult drive you home and stay with you for the first 24 hours.    Do not drive a car or drink any alcohol for 24 hours after surgery.  Do not do any strenuous work or activity for the next 24 hours.  You may have mild nausea, a sore throat or raspy voice for a few days.    You received a local numbing block to the foot after your surgery. You should expect the surgical extremity to remain numb for the next 6-12 hours.    POST-OP INSTRUCTIONS:  Keep dressing clean, dry and intact until your first post-operative appointment.  Do not remove surgical dressing. You may need to loosen if necessary.   Do not shower unless using a cast protector to protect dressing.  If dressing gets wet, please call office immediately as this can lead to increased risk of infection or wound dehiscence.   Elevate surgical extremity as much as possible to help with pain and swelling.  Place ice pack behind knee of surgical extremity (20 minutes on/20 minutes off while awake). After 24 hours use ice behind the knee as needed for comfort.  Weight Bearing Status: Bear weight as tolerated in surgical shoe.  Please resume all home medications.   Post-Operative Medications: For your pain medication, take as needed; wean off as soon as tolerated. In the event you have constipation, you may take over the counter stool softeners and laxatives as needed.  Post-operative appointment with Dr. Macdonald: Please call to schedule if not already scheduled.  Should any problems, questions, or concerns arise, please call the clinic office.    WHEN TO CALL YOUR DOCTOR:  Fever (>100.4°F or 38.0°C) or chills.  Incision problems such as redness, warmth, swelling, or foul smelling drainage.  Severe nausea or persistent vomiting.  Pain and swelling in your legs, especially if it is only on one side and not the other.  Pain with urination, cloudy urine, or foul smelling urine.  Or  if you have any other problems or questions.  CALL 911 or go to the ED if you have any shortness of breath, difficulty breathing, or chest pain.

## 2024-05-22 NOTE — OP NOTE
RIGHT FOOT BUNIONECTOMY WITH PROXIMAL PHALANX OSTEOTOMY & MINI C-ARM (R), & RIGHT 2nd TOE HAMMERTOE CORRECTION  **ARTHREX, 1 LOANER** (R) Operative Note     Date: 2024  OR Location: [facility]    Name: Lauren Perez, : 1945, Age: 78 y.o., MRN: [id number], Sex: female    Diagnosis  Pre-op Diagnosis     * Acquired hallux valgus of right foot [M20.11]     * Acquired hammer toe of right foot [M20.41] Post-op Diagnosis     * Acquired hallux valgus of right foot [M20.11]     * Acquired hammer toe of right foot [M20.41]     Procedures  RIGHT FOOT BUNIONECTOMY WITH PROXIMAL PHALANX OSTEOTOMY & MINI C-ARM  90831 - [place] CORRJ HLX VLGS BNCTY SESMDC PROX PHLX OSTEOT    & RIGHT 2nd TOE HAMMERTOE CORRECTION  **ARTHREX, 1 LOANER**  69423 - [place] CORRECTION HAMMERTOE      Surgeons      * Gwen Macdonald - Primary    Resident/Fellow/Other Assistant:  Surgeons and Role:     * Renee Pereira DPM - Resident - Assisting    Procedure Summary  Anesthesia: General  ASA: II  Anesthesia Staff: Anesthesiologist: Chu Zazueta MD  CRNA: SHERON Cabrera-CRNA  Estimated Blood Loss: 5 mL  Intra-op Medications:   Administrations occurring from 0730 to 0935 on 24:   Medication Name Total Dose   lidocaine (Xylocaine) 20 mg/mL (2 %) injection 6.5 mL   BUPivacaine HCl (Marcaine) 0.5 % (5 mg/mL) injection 6.5 mL   ceFAZolin in dextrose (iso-os) (Ancef) IVPB 2 g 2 g              Anesthesia Record               Intraprocedure I/O Totals       None           Specimen: No specimens collected     Staff:   Circulator: Fatou Sánchez Person: Mony         Drains and/or Catheters: * None in log *    Tourniquet Times: no tourniquet        Implants:  Implants       Type Name Action Serial No.       micro compression screw 24mm Implanted       micro compression screw 26mm Implanted                 Indications: Lauren Perez is an 78 y.o. female who is having surgery for Acquired hallux valgus of right foot [M20.11]  Acquired hammer toe of right  foot [M20.41].     The patient was seen in the preoperative area. The risks, benefits, complications, treatment options, non-operative alternatives, expected recovery and outcomes were discussed with the patient. The possibilities of reaction to medication, pulmonary aspiration, injury to surrounding structures, bleeding, recurrent infection, the need for additional procedures, failure to diagnose a condition, and creating a complication requiring transfusion or operation were discussed with the patient. The patient concurred with the proposed plan, giving informed consent.  The site of surgery was properly noted/marked if necessary per policy. The patient has been actively warmed in preoperative area. Preoperative antibiotics have been ordered and given within 1 hours of incision. Venous thrombosis prophylaxis have been ordered including unilateral sequential compression device    This is a 78 y.o. female  patient presents to [facility] for an elective surgery of the right lower extremity.  The patient has failed conservative treatment and wished to proceed with surgical intervention.  The nature of the deformity, problems, anticipated procedures, postop recovery, convalescence, risks, complications including, but not limited to numbness, CRPS, over or under correction, problems healing soft tissue or bone, persistent pain, disability, requiring multiple future surgeries, delayed union nonunion malunion ,  loss of limb, loss of life, have been explained to the patient in detail.  All questions have been answered to the patient's satisfaction.  No promises or guarantees have been given.      Procedure Details: Under mild sedation, the patient was brought to operating room, placed on operating table in supine position.    No tourniquet was utilized for this procedure.  The right lower extremity was then scrubbed, prepped, and draped in usual aseptic manner.      Procedure #1:  Hammertoe second digit PIPJ  fusion  Attention was then directed to the PIPJ of the second digit.  Incision was then made medially.  Using MIS bur the joint was then prepped remove any cartilages of the base of the intermediate phalanx and the head of the proximal phalanx.  Copious amount normal saline was then used to flush and remove any bone paste.  K wire was then first inserted from the distal toe to the proximal phalanx.  A 2.5 cannulated headless screw was then inserted from the distal digit to the intermediate phalanx and proximal phalanx for the fusion of the PIPJ.  Good bone to bone apposition was then noted in the second PIPJ was noted to be in a rectus position.      Procedure #2: Akin Osteotomy and Cheilectomy    Attention was then directed to the proximal phalanx.  Incision was then made where the previous osteotomy site that was marked out.  Using the MIS bur, modified Jayson osteotomy at this time was then performed.  The osteotomy site was then fixated with a 2.5 headless screw from distal lateral to proximal medial in standard fashion requirement and according to recommended technique under multiple views fluoroscopy.  Post fixation, it was noted good to good bone apposition and the hallux was noted to be in rectus position.  Incision was then flushed with copious amount normal saline.    Attention was then directed over to the first metatarsal head.  Just proximal medially, incision was then made.  Using a MIS bur, the medial prominence at this time was then excised and bur down with adequate reduction of the bunion deformity.  It was confirmed with multiple views of fluoroscopy.  The oral flush was then performed to extract any bone paste it was confirmed on fluoroscopy.    Any bleeders were then cauterized and achieved with cauterization and compression.    The incisions were then closed by 3-0 Prolene.     The incisions were dressed with Betadine-soaked Adaptic, gauze, Kerlix, and Ace bandage.   A compressive dressing was  then applied to the right lower extremity.      The patient tolerated procedure and anesthesia well in apparent satisfactory condition.      The patient was then transferred to PACU, vital signs stable and vascular status intact to digits for monitoring prior to discharge.   Complications:  None; patient tolerated the procedure well.    Disposition: PACU - hemodynamically stable.  Condition: stable         Gwen Macdonald  Phone Number: [phone]

## 2024-07-27 ENCOUNTER — APPOINTMENT (OUTPATIENT)
Dept: CARDIOLOGY | Facility: HOSPITAL | Age: 79
End: 2024-07-27
Payer: MEDICARE

## 2024-07-27 ENCOUNTER — HOSPITAL ENCOUNTER (EMERGENCY)
Facility: HOSPITAL | Age: 79
Discharge: HOME | End: 2024-07-27
Attending: EMERGENCY MEDICINE
Payer: MEDICARE

## 2024-07-27 ENCOUNTER — APPOINTMENT (OUTPATIENT)
Dept: RADIOLOGY | Facility: HOSPITAL | Age: 79
End: 2024-07-27
Payer: MEDICARE

## 2024-07-27 VITALS
HEART RATE: 91 BPM | HEIGHT: 61 IN | TEMPERATURE: 98.8 F | WEIGHT: 135 LBS | DIASTOLIC BLOOD PRESSURE: 56 MMHG | SYSTOLIC BLOOD PRESSURE: 114 MMHG | BODY MASS INDEX: 25.49 KG/M2 | RESPIRATION RATE: 18 BRPM | OXYGEN SATURATION: 96 %

## 2024-07-27 DIAGNOSIS — N30.00 ACUTE CYSTITIS WITHOUT HEMATURIA: Primary | ICD-10-CM

## 2024-07-27 LAB
ALBUMIN SERPL BCP-MCNC: 4 G/DL (ref 3.4–5)
ALP SERPL-CCNC: 81 U/L (ref 33–136)
ALT SERPL W P-5'-P-CCNC: 8 U/L (ref 7–45)
ANION GAP SERPL CALC-SCNC: 14 MMOL/L (ref 10–20)
APPEARANCE UR: ABNORMAL
AST SERPL W P-5'-P-CCNC: 15 U/L (ref 9–39)
BACTERIA #/AREA URNS AUTO: ABNORMAL /HPF
BASOPHILS # BLD AUTO: 0.03 X10*3/UL (ref 0–0.1)
BASOPHILS NFR BLD AUTO: 0.3 %
BILIRUB DIRECT SERPL-MCNC: 0.2 MG/DL (ref 0–0.3)
BILIRUB SERPL-MCNC: 0.6 MG/DL (ref 0–1.2)
BILIRUB UR STRIP.AUTO-MCNC: NEGATIVE MG/DL
BUN SERPL-MCNC: 21 MG/DL (ref 6–23)
CALCIUM SERPL-MCNC: 9 MG/DL (ref 8.6–10.3)
CHLORIDE SERPL-SCNC: 105 MMOL/L (ref 98–107)
CO2 SERPL-SCNC: 23 MMOL/L (ref 21–32)
COLOR UR: ABNORMAL
CREAT SERPL-MCNC: 1.25 MG/DL (ref 0.5–1.05)
EGFRCR SERPLBLD CKD-EPI 2021: 44 ML/MIN/1.73M*2
EOSINOPHIL # BLD AUTO: 0.09 X10*3/UL (ref 0–0.4)
EOSINOPHIL NFR BLD AUTO: 0.8 %
ERYTHROCYTE [DISTWIDTH] IN BLOOD BY AUTOMATED COUNT: 12.2 % (ref 11.5–14.5)
GLUCOSE SERPL-MCNC: 122 MG/DL (ref 74–99)
GLUCOSE UR STRIP.AUTO-MCNC: NORMAL MG/DL
HCT VFR BLD AUTO: 40.4 % (ref 36–46)
HGB BLD-MCNC: 13.1 G/DL (ref 12–16)
HOLD SPECIMEN: NORMAL
IMM GRANULOCYTES # BLD AUTO: 0.03 X10*3/UL (ref 0–0.5)
IMM GRANULOCYTES NFR BLD AUTO: 0.3 % (ref 0–0.9)
KETONES UR STRIP.AUTO-MCNC: NEGATIVE MG/DL
LACTATE SERPL-SCNC: 1.7 MMOL/L (ref 0.4–2)
LEUKOCYTE ESTERASE UR QL STRIP.AUTO: ABNORMAL
LYMPHOCYTES # BLD AUTO: 2.6 X10*3/UL (ref 0.8–3)
LYMPHOCYTES NFR BLD AUTO: 24.3 %
MAGNESIUM SERPL-MCNC: 1.92 MG/DL (ref 1.6–2.4)
MCH RBC QN AUTO: 29.5 PG (ref 26–34)
MCHC RBC AUTO-ENTMCNC: 32.4 G/DL (ref 32–36)
MCV RBC AUTO: 91 FL (ref 80–100)
MONOCYTES # BLD AUTO: 1.05 X10*3/UL (ref 0.05–0.8)
MONOCYTES NFR BLD AUTO: 9.8 %
MUCOUS THREADS #/AREA URNS AUTO: ABNORMAL /LPF
NEUTROPHILS # BLD AUTO: 6.88 X10*3/UL (ref 1.6–5.5)
NEUTROPHILS NFR BLD AUTO: 64.5 %
NITRITE UR QL STRIP.AUTO: NEGATIVE
NRBC BLD-RTO: 0 /100 WBCS (ref 0–0)
PH UR STRIP.AUTO: 6 [PH]
PLATELET # BLD AUTO: 205 X10*3/UL (ref 150–450)
POTASSIUM SERPL-SCNC: 3.5 MMOL/L (ref 3.5–5.3)
PROT SERPL-MCNC: 7.1 G/DL (ref 6.4–8.2)
PROT UR STRIP.AUTO-MCNC: ABNORMAL MG/DL
RBC # BLD AUTO: 4.44 X10*6/UL (ref 4–5.2)
RBC # UR STRIP.AUTO: ABNORMAL /UL
RBC #/AREA URNS AUTO: >20 /HPF
SODIUM SERPL-SCNC: 138 MMOL/L (ref 136–145)
SP GR UR STRIP.AUTO: 1.02
SQUAMOUS #/AREA URNS AUTO: ABNORMAL /HPF
UROBILINOGEN UR STRIP.AUTO-MCNC: ABNORMAL MG/DL
WBC # BLD AUTO: 10.7 X10*3/UL (ref 4.4–11.3)
WBC #/AREA URNS AUTO: >50 /HPF
WBC CLUMPS #/AREA URNS AUTO: ABNORMAL /HPF

## 2024-07-27 PROCEDURE — 96361 HYDRATE IV INFUSION ADD-ON: CPT

## 2024-07-27 PROCEDURE — 81001 URINALYSIS AUTO W/SCOPE: CPT

## 2024-07-27 PROCEDURE — 2500000004 HC RX 250 GENERAL PHARMACY W/ HCPCS (ALT 636 FOR OP/ED)

## 2024-07-27 PROCEDURE — 36415 COLL VENOUS BLD VENIPUNCTURE: CPT

## 2024-07-27 PROCEDURE — 99285 EMERGENCY DEPT VISIT HI MDM: CPT

## 2024-07-27 PROCEDURE — 74177 CT ABD & PELVIS W/CONTRAST: CPT | Performed by: RADIOLOGY

## 2024-07-27 PROCEDURE — 2550000001 HC RX 255 CONTRASTS

## 2024-07-27 PROCEDURE — 85025 COMPLETE CBC W/AUTO DIFF WBC: CPT | Performed by: EMERGENCY MEDICINE

## 2024-07-27 PROCEDURE — 83735 ASSAY OF MAGNESIUM: CPT

## 2024-07-27 PROCEDURE — 82248 BILIRUBIN DIRECT: CPT | Performed by: EMERGENCY MEDICINE

## 2024-07-27 PROCEDURE — 87086 URINE CULTURE/COLONY COUNT: CPT | Mod: PARLAB

## 2024-07-27 PROCEDURE — 83605 ASSAY OF LACTIC ACID: CPT

## 2024-07-27 PROCEDURE — 87040 BLOOD CULTURE FOR BACTERIA: CPT | Mod: PARLAB

## 2024-07-27 PROCEDURE — 96365 THER/PROPH/DIAG IV INF INIT: CPT | Mod: 59

## 2024-07-27 PROCEDURE — 93005 ELECTROCARDIOGRAM TRACING: CPT

## 2024-07-27 PROCEDURE — 74177 CT ABD & PELVIS W/CONTRAST: CPT

## 2024-07-27 RX ORDER — CEFTRIAXONE 2 G/50ML
2 INJECTION, SOLUTION INTRAVENOUS ONCE
Status: DISCONTINUED | OUTPATIENT
Start: 2024-07-27 | End: 2024-07-27

## 2024-07-27 RX ORDER — CEPHALEXIN 500 MG/1
500 CAPSULE ORAL 4 TIMES DAILY
Qty: 28 CAPSULE | Refills: 0 | Status: SHIPPED | OUTPATIENT
Start: 2024-07-27 | End: 2024-08-03

## 2024-07-27 RX ORDER — ACETAMINOPHEN 325 MG/1
975 TABLET ORAL ONCE
Status: COMPLETED | OUTPATIENT
Start: 2024-07-27 | End: 2024-07-27

## 2024-07-27 RX ORDER — CEFTRIAXONE 1 G/50ML
1 INJECTION, SOLUTION INTRAVENOUS ONCE
Status: COMPLETED | OUTPATIENT
Start: 2024-07-27 | End: 2024-07-27

## 2024-07-27 RX ADMIN — SODIUM CHLORIDE 1000 ML: 9 INJECTION, SOLUTION INTRAVENOUS at 09:58

## 2024-07-27 RX ADMIN — ACETAMINOPHEN 975 MG: 325 TABLET ORAL at 09:58

## 2024-07-27 RX ADMIN — CEFTRIAXONE SODIUM 1 G: 1 INJECTION, SOLUTION INTRAVENOUS at 10:56

## 2024-07-27 RX ADMIN — IOHEXOL 75 ML: 350 INJECTION, SOLUTION INTRAVENOUS at 11:30

## 2024-07-27 ASSESSMENT — COLUMBIA-SUICIDE SEVERITY RATING SCALE - C-SSRS
2. HAVE YOU ACTUALLY HAD ANY THOUGHTS OF KILLING YOURSELF?: NO
2. HAVE YOU ACTUALLY HAD ANY THOUGHTS OF KILLING YOURSELF?: NO
6. HAVE YOU EVER DONE ANYTHING, STARTED TO DO ANYTHING, OR PREPARED TO DO ANYTHING TO END YOUR LIFE?: NO
1. IN THE PAST MONTH, HAVE YOU WISHED YOU WERE DEAD OR WISHED YOU COULD GO TO SLEEP AND NOT WAKE UP?: NO
1. SINCE LAST CONTACT, HAVE YOU WISHED YOU WERE DEAD OR WISHED YOU COULD GO TO SLEEP AND NOT WAKE UP?: NO
6. HAVE YOU EVER DONE ANYTHING, STARTED TO DO ANYTHING, OR PREPARED TO DO ANYTHING TO END YOUR LIFE?: NO

## 2024-07-27 ASSESSMENT — LIFESTYLE VARIABLES
HAVE PEOPLE ANNOYED YOU BY CRITICIZING YOUR DRINKING: NO
HAVE YOU EVER FELT YOU SHOULD CUT DOWN ON YOUR DRINKING: NO
TOTAL SCORE: 0
EVER FELT BAD OR GUILTY ABOUT YOUR DRINKING: NO
EVER HAD A DRINK FIRST THING IN THE MORNING TO STEADY YOUR NERVES TO GET RID OF A HANGOVER: NO

## 2024-07-27 ASSESSMENT — PAIN SCALES - GENERAL
PAINLEVEL_OUTOF10: 3
PAINLEVEL_OUTOF10: 2

## 2024-07-27 ASSESSMENT — PAIN - FUNCTIONAL ASSESSMENT: PAIN_FUNCTIONAL_ASSESSMENT: 0-10

## 2024-07-27 NOTE — ED PROVIDER NOTES
"History of Present Illness   Information Gathering: Information collected from patient and discharge summary on May 22 for past medical history.    HPI:  Lauren Perez is a 78 y.o. female with PMH significant for Anxiety, Depression, Diabetes, GERD, and HTN presenting to the emergency department for refractory UTI symptoms. Patient states that 2 weeks ago, she developed burning sensation when peeing with suprapubic tenderness and she went to urgent care where she was given 5-day course of ciprofloxacin. She states that she finished the course and symptoms improved moderately but never fully went away. She states that after the antibiotics were finished, her urinary symptoms returned worse with burning sensation, \"wobbly legs\" making it hard for her to walk and mild bilateral flank pain. No fevers at home but patient does endorse chills and \"shakiness\". No nausea, vomiting, diarrhea. No chest pain or abdominal pain. Some mild pain suprapubically. Denies vaginal bleeding or abnormal discharge.      Physical Exam   Triage vitals:  T 37.1 °C (98.8 °F)  HR 91  /56  RR 18  O2 96 %      Physical Exam  Constitutional:       Appearance: Normal appearance.   HENT:      Head: Normocephalic and atraumatic.   Eyes:      Extraocular Movements: Extraocular movements intact.      Pupils: Pupils are equal, round, and reactive to light.   Cardiovascular:      Rate and Rhythm: Normal rate and regular rhythm.   Pulmonary:      Effort: Pulmonary effort is normal.      Breath sounds: Normal breath sounds.      Comments: No wheezes rales or rhonchi. Patient saturating 96% on room air without use of accessory muscles.  Abdominal:      Comments: Abdomen soft nontender to palpation. No overlying skin changes. Mild suprapubic tenderness.   Musculoskeletal:         General: No swelling or signs of injury. Normal range of motion.   Neurological:      General: No focal deficit present.      Mental Status: She is alert and oriented to " person, place, and time.       Medical Decision Making & ED Course   Medical Decision Makin y.o. female with PMH significant for Anxiety, Depression, Diabetes, GERD, and HTN presenting to the emergency department for refractory UTI symptoms. Based on patient's symptoms, patient likely experiencing worsening cystitis with concerns for possible systemic infection given failed outpatient therapy and systemic symptoms of chills. She is afebrile on exam and hemodynamically stable and overall appears well - nontoxic-appearing. Differential includes sepsis, pyelonephritis, cystitis, SBO. For further workup, will obtain urinalysis, basic labs including CBC CMP and lactate. Given patient's hemodynamic stability and clinical well appearance - immediate antibiotics held so that they could be more specifically tailored following workup.   Labs resulted demonstrating significant UTI with leukocyte esterase greater than 500 and many polymorphic cells with 2+ bacteria confirming diagnosis of patient's symptoms. BMP without severe electrolyte derangements with mild elevation of creatinine. Patient being treated with 1 L bolus of fluids. Hepatic function panel within normal limits and CBC shows no evidence of leukocytosis or anemia. Lactate and magnesium within normal limits as well. On reassessment, patient continues to appear clinically very well without signs of CVA tenderness, generalized abdominal pain, fevers or altered mental status. As result of her positive UTI with failed ciprofloxacin, patient would will be treated with 1 g Rocephin IV.   Given patient's second presentation for UTI and overall clinical wellbeing with plan to send home, will obtain CT abdomen pelvis with contrast to screen for possible yeah unlikely pyelonephritis versus asymptomatic stone that would require admission to the hospital. CT abdomen pelvis shows acute cystitis without evidence of pyelonephritis or obstructive stone. Findings were  discussed with patient with plan to discharge on Keflex while awaiting urine cultures. Patient was notified that if cultures come back showing that antibiotic needs to be changed, pharmacy will call her. Patient feels comfortable with this plan and was given good return precautions to come back to the emergency department if she experiences worsening of her conditions, fevers, chills. Patient was reassessed, found to remain hemodynamically stable and very well-appearing and was discharged home in stable condition.    ED Course:  ED Course as of 07/27/24 1157   Sat Jul 27, 2024   1045 ECG 12 lead  EKG interpretation: Heart rate 69  Axis regular  Normal IL interval, regular QRS. No ST changes appreciated. [MARTÍNEZ]   1048 Labs resulted demonstrating significant UTI with leukocyte esterase greater than 500 and many polymorphic cells with 2+ bacteria confirming diagnosis of patient's symptoms. BMP without severe electrolyte derangements with mild elevation of creatinine. Patient being treated with 1 L bolus of fluids. Hepatic function panel within normal limits and CBC shows no evidence of leukocytosis or anemia. Lactate and magnesium within normal limits as well. On reassessment, patient continues to appear clinically very well without signs of CVA tenderness, generalized abdominal pain, fevers or altered mental status. As result of her positive UTI with failed ciprofloxacin, patient would will be treated with 1 g Rocephin IV. [MARTÍNEZ]      ED Course User Index  [MARTÍNEZ] Peter Desouza MD         Diagnoses as of 07/27/24 1157   Acute cystitis without hematuria       ----  EKG Independent Interpretation: EKG interpreted by myself. Please see ED Course for full interpretation.    Independent Result Review and Interpretation: Relevant laboratory and radiographic results were reviewed and independently interpreted by myself.  As necessary, they are commented on in the ED Course.    Chronic conditions affecting the patient's care: As  documented above in MDM    The patient was discussed with the following consultants/services: As described in MDM      Disposition   As a result of the work-up, the patient was discharged home.  she was informed of her diagnosis and instructed to come back with any concerns or worsening of condition.  she and was agreeable to the plan as discussed above.  she was given the opportunity to ask questions.  All of the patient's questions were answered.    Procedures   Procedures    Patient seen and discussed with ED attending physician.    Sony Desouza MD  Emergency Medicine, PGY-2      Peter Desouza MD  Resident  07/27/24 1200

## 2024-07-28 LAB
BACTERIA BLD CULT: NORMAL
BACTERIA BLD CULT: NORMAL
BACTERIA UR CULT: ABNORMAL

## 2024-07-29 LAB — BACTERIA UR CULT: ABNORMAL

## 2024-07-30 ENCOUNTER — TELEPHONE (OUTPATIENT)
Dept: PHARMACY | Facility: HOSPITAL | Age: 79
End: 2024-07-30
Payer: MEDICARE

## 2024-07-30 LAB
ATRIAL RATE: 69 BPM
P AXIS: 59 DEGREES
P OFFSET: 194 MS
P ONSET: 134 MS
PR INTERVAL: 176 MS
Q ONSET: 222 MS
QRS COUNT: 11 BEATS
QRS DURATION: 78 MS
QT INTERVAL: 412 MS
QTC CALCULATION(BAZETT): 441 MS
QTC FREDERICIA: 431 MS
R AXIS: 55 DEGREES
T AXIS: 47 DEGREES
T OFFSET: 428 MS
VENTRICULAR RATE: 69 BPM

## 2024-07-30 NOTE — PROGRESS NOTES
EDPD Note: Rapid Result Review    Reviewed Mr./Mrs./Ms. Lauren Perez 's chart regarding a positive urine culture/result that was taken during their recent emergency room visit. The patient was not told about these results prior to leaving the emergency department. Therefore, patient was contacted and given proper education. Patient did see PCP at [facility] and had follow up. Patient states she is still having some vaginal discomfort. Denies flank pain. Advised patient to finish full course of antibiotic and follow up with PCP or urgent care if symptoms do not completely resolve.      Susceptibility data from last 90 days.  Collected Specimen Info Organism Amoxicillin/Clavulanate Ampicillin Ampicillin/Sulbactam Cefazolin Cefazolin (uncomplicated UTIs only) Ceftriaxone Ciprofloxacin Gentamicin Nitrofurantoin Piperacillin/Tazobactam   07/27/24 Urine from Clean Catch/Voided Escherichia coli  S  R  R  I  S  S  R  S  S  S     Collected Specimen Info Organism Trimethoprim/Sulfamethoxazole   07/27/24 Urine from Clean Catch/Voided Escherichia coli  S       No further follow up needed from EDPD Team.     Keyonna Martínez, PharmD

## 2024-07-31 LAB
BACTERIA BLD CULT: NORMAL
BACTERIA BLD CULT: NORMAL

## 2024-08-13 PROBLEM — E11.42 DIABETIC POLYNEUROPATHY (MULTI): Status: ACTIVE | Noted: 2024-08-13

## 2024-08-13 PROBLEM — R29.898 BILATERAL LEG WEAKNESS: Status: ACTIVE | Noted: 2024-05-20

## 2024-09-04 ENCOUNTER — APPOINTMENT (OUTPATIENT)
Dept: CARDIOLOGY | Facility: CLINIC | Age: 79
End: 2024-09-04
Payer: MEDICARE

## 2024-09-04 VITALS
SYSTOLIC BLOOD PRESSURE: 130 MMHG | HEART RATE: 63 BPM | OXYGEN SATURATION: 96 % | WEIGHT: 135 LBS | BODY MASS INDEX: 25.51 KG/M2 | DIASTOLIC BLOOD PRESSURE: 70 MMHG

## 2024-09-04 DIAGNOSIS — I49.1 PAC (PREMATURE ATRIAL CONTRACTION): ICD-10-CM

## 2024-09-04 DIAGNOSIS — I25.84 CORONARY ARTERY CALCIFICATION: ICD-10-CM

## 2024-09-04 DIAGNOSIS — I10 BENIGN ESSENTIAL HYPERTENSION: Primary | ICD-10-CM

## 2024-09-04 DIAGNOSIS — I34.0 MITRAL VALVE INSUFFICIENCY, UNSPECIFIED ETIOLOGY: ICD-10-CM

## 2024-09-04 DIAGNOSIS — E78.00 PURE HYPERCHOLESTEROLEMIA: ICD-10-CM

## 2024-09-04 DIAGNOSIS — I25.10 CORONARY ARTERY CALCIFICATION: ICD-10-CM

## 2024-09-04 PROCEDURE — 3078F DIAST BP <80 MM HG: CPT | Performed by: INTERNAL MEDICINE

## 2024-09-04 PROCEDURE — 3075F SYST BP GE 130 - 139MM HG: CPT | Performed by: INTERNAL MEDICINE

## 2024-09-04 PROCEDURE — G2211 COMPLEX E/M VISIT ADD ON: HCPCS | Performed by: INTERNAL MEDICINE

## 2024-09-04 PROCEDURE — 1036F TOBACCO NON-USER: CPT | Performed by: INTERNAL MEDICINE

## 2024-09-04 PROCEDURE — 99213 OFFICE O/P EST LOW 20 MIN: CPT | Performed by: INTERNAL MEDICINE

## 2024-09-04 PROCEDURE — 1159F MED LIST DOCD IN RCRD: CPT | Performed by: INTERNAL MEDICINE

## 2024-09-04 PROCEDURE — 1157F ADVNC CARE PLAN IN RCRD: CPT | Performed by: INTERNAL MEDICINE

## 2024-09-04 RX ORDER — TRAMADOL HYDROCHLORIDE 50 MG/1
50 TABLET ORAL
COMMUNITY
Start: 2024-08-27 | End: 2024-10-26

## 2024-09-04 RX ORDER — MONTELUKAST SODIUM 10 MG/1
TABLET ORAL
COMMUNITY
Start: 2024-08-08

## 2024-09-04 NOTE — LETTER
September 4, 2024     Bettie Paz MD  [address]  [address] [address]  [facility] [id number]    Patient: Lauren Perez   YOB: 1945   Date of Visit: 9/4/2024       Dear Dr. Bettie Paz MD:    Thank you for referring Lauren Perez to me for evaluation. Below are my notes for this consultation.  If you have questions, please do not hesitate to call me. I look forward to following your patient along with you.       Sincerely,     Dao Hdez,       CC: No Recipients  ______________________________________________________________________________________    Chief Complaint:   Follow-up (Patient is here for a follow up/)     History Of Present Illness:    Lauren Perez is a 78 y.o. female presenting with cardiovascular disease.  Patient denies chest pain/SOB/palpitations/dizziness/lightheadedness/edema/claudication    No regular exercise-activity limited by back/leg weakness       Last Recorded Vitals:  Vitals:    09/04/24 1304 09/04/24 1329   BP: 135/71 130/70   BP Location: Left arm    Patient Position: Sitting    BP Cuff Size: Adult    Pulse: 63    SpO2: 96%    Weight: 61.2 kg (135 lb)             Allergies:  Codeine, Sulfamethoxazole-trimethoprim, and Ace inhibitors    Outpatient Medications:  Current Outpatient Medications   Medication Instructions   • amLODIPine (Norvasc) 10 mg tablet 1 tablet, oral, Daily   • clonazePAM (KLONOPIN) 0.5 mg, oral, 2 times daily   • gabapentin (NEURONTIN) 300 mg, oral, 3 times daily   • montelukast (Singulair) 10 mg tablet    • mv-mn/om3/dha/epa/fish/lut/savanna (OCUVITE ADULT 50 PLUS ORAL) Take as directed   • oxyCODONE-acetaminophen (Percocet) 5-325 mg tablet 1 tablet, oral, Every 6 hours PRN   • pantoprazole (ProtoNix) 40 mg EC tablet 1 tablet, oral, Daily   • ProAir HFA 90 mcg/actuation inhaler INHALE 2 PUFFS BY MOUTH AS NEEDED EVERY 6 HOURS   • rosuvastatin (CRESTOR) 20 mg, oral, Daily   • traMADol (ULTRAM) 50 mg   • traZODone (Desyrel) 50  mg tablet        Physical Exam:  Constitutional:       General: Awake.      Appearance: Healthy appearance. Not in distress.   Neck:      Vascular: No JVR. JVD normal.   Pulmonary:      Effort: Pulmonary effort is normal.      Breath sounds: Normal breath sounds. No wheezing. No rhonchi. No rales.   Chest:      Chest wall: Not tender to palpatation.   Cardiovascular:      PMI at left midclavicular line. Normal rate. Frequent ectopic beats. Regular rhythm. Normal S1. Normal S2.       Murmurs: There is no murmur.      No gallop.  No click. No rub.   Pulses:     Intact distal pulses.   Edema:     Peripheral edema absent.   Abdominal:      General: Bowel sounds are normal.      Palpations: Abdomen is soft.      Tenderness: There is no abdominal tenderness.   Musculoskeletal: Normal range of motion.         General: No tenderness. Skin:     General: Skin is warm and dry.   Neurological:      General: No focal deficit present.      Mental Status: Alert and oriented to person, place and time.          Last Labs:  CBC -  Lab Results   Component Value Date    WBC 10.7 07/27/2024    HGB 13.1 07/27/2024    HCT 40.4 07/27/2024    MCV 91 07/27/2024     07/27/2024       CMP -  Lab Results   Component Value Date    CALCIUM 9.0 07/27/2024    PROT 7.1 07/27/2024    ALBUMIN 4.0 07/27/2024    AST 15 07/27/2024    ALT 8 07/27/2024    ALKPHOS 81 07/27/2024    BILITOT 0.6 07/27/2024       LIPID PANEL -   Lab Results   Component Value Date    CHOL 130 08/03/2023    TRIG 84 08/03/2023    HDL 46.7 08/03/2023    CHHDL 2.8 08/03/2023    LDLF 67 08/03/2023    VLDL 17 08/03/2023       RENAL FUNCTION PANEL -   Lab Results   Component Value Date    GLUCOSE 122 (H) 07/27/2024     07/27/2024    K 3.5 07/27/2024     07/27/2024    CO2 23 07/27/2024    ANIONGAP 14 07/27/2024    BUN 21 07/27/2024    CREATININE 1.25 (H) 07/27/2024    CALCIUM 9.0 07/27/2024    ALBUMIN 4.0 07/27/2024         Lab Results   Component Value Date    BNP 35  10/31/2019    HGBA1C 5.7 (H) 04/02/2024           Lab review: I have personally reviewed the laboratory result(s)      Problem List Items Addressed This Visit       Benign essential hypertension - Primary    Overview     BP well controlled on current medications   7/2024 BMP OK         Coronary artery calcification    Overview     Noted on 6/2020 chest CT   8/2023 stress test NL   No angina  On statin … intolerant of ASA as has stomach issues            Hyperlipidemia    Overview     On high-intensity statin -has CAC  8/2023 LDL=67   Recheck         Mitral regurgitation    Overview     Mild-moderate per 2/2021 echo   No murmur on exam   Stable per 8/2023 echo    Follow         PAC (premature atrial contraction)    Overview     Noted on prior EKG   Noted on exam today  Asymptomatic   Prior echo with NL LVEF            Lipids      Dao Hdez, DO

## 2024-09-04 NOTE — PROGRESS NOTES
Chief Complaint:   Follow-up (Patient is here for a follow up/)     History Of Present Illness:    Lauren Perez is a 78 y.o. female presenting with cardiovascular disease.  Patient denies chest pain/SOB/palpitations/dizziness/lightheadedness/edema/claudication    No regular exercise-activity limited by back/leg weakness       Last Recorded Vitals:  Vitals:    09/04/24 1304 09/04/24 1329   BP: 135/71 130/70   BP Location: Left arm    Patient Position: Sitting    BP Cuff Size: Adult    Pulse: 63    SpO2: 96%    Weight: 61.2 kg (135 lb)             Allergies:  Codeine, Sulfamethoxazole-trimethoprim, and Ace inhibitors    Outpatient Medications:  Current Outpatient Medications   Medication Instructions    amLODIPine (Norvasc) 10 mg tablet 1 tablet, oral, Daily    clonazePAM (KLONOPIN) 0.5 mg, oral, 2 times daily    gabapentin (NEURONTIN) 300 mg, oral, 3 times daily    montelukast (Singulair) 10 mg tablet     mv-mn/om3/dha/epa/fish/lut/savanna (OCUVITE ADULT 50 PLUS ORAL) Take as directed    oxyCODONE-acetaminophen (Percocet) 5-325 mg tablet 1 tablet, oral, Every 6 hours PRN    pantoprazole (ProtoNix) 40 mg EC tablet 1 tablet, oral, Daily    ProAir HFA 90 mcg/actuation inhaler INHALE 2 PUFFS BY MOUTH AS NEEDED EVERY 6 HOURS    rosuvastatin (CRESTOR) 20 mg, oral, Daily    traMADol (ULTRAM) 50 mg    traZODone (Desyrel) 50 mg tablet        Physical Exam:  Constitutional:       General: Awake.      Appearance: Healthy appearance. Not in distress.   Neck:      Vascular: No JVR. JVD normal.   Pulmonary:      Effort: Pulmonary effort is normal.      Breath sounds: Normal breath sounds. No wheezing. No rhonchi. No rales.   Chest:      Chest wall: Not tender to palpatation.   Cardiovascular:      PMI at left midclavicular line. Normal rate. Frequent ectopic beats. Regular rhythm. Normal S1. Normal S2.       Murmurs: There is no murmur.      No gallop.  No click. No rub.   Pulses:     Intact distal pulses.   Edema:     Peripheral  edema absent.   Abdominal:      General: Bowel sounds are normal.      Palpations: Abdomen is soft.      Tenderness: There is no abdominal tenderness.   Musculoskeletal: Normal range of motion.         General: No tenderness. Skin:     General: Skin is warm and dry.   Neurological:      General: No focal deficit present.      Mental Status: Alert and oriented to person, place and time.          Last Labs:  CBC -  Lab Results   Component Value Date    WBC 10.7 07/27/2024    HGB 13.1 07/27/2024    HCT 40.4 07/27/2024    MCV 91 07/27/2024     07/27/2024       CMP -  Lab Results   Component Value Date    CALCIUM 9.0 07/27/2024    PROT 7.1 07/27/2024    ALBUMIN 4.0 07/27/2024    AST 15 07/27/2024    ALT 8 07/27/2024    ALKPHOS 81 07/27/2024    BILITOT 0.6 07/27/2024       LIPID PANEL -   Lab Results   Component Value Date    CHOL 130 08/03/2023    TRIG 84 08/03/2023    HDL 46.7 08/03/2023    CHHDL 2.8 08/03/2023    LDLF 67 08/03/2023    VLDL 17 08/03/2023       RENAL FUNCTION PANEL -   Lab Results   Component Value Date    GLUCOSE 122 (H) 07/27/2024     07/27/2024    K 3.5 07/27/2024     07/27/2024    CO2 23 07/27/2024    ANIONGAP 14 07/27/2024    BUN 21 07/27/2024    CREATININE 1.25 (H) 07/27/2024    CALCIUM 9.0 07/27/2024    ALBUMIN 4.0 07/27/2024         Lab Results   Component Value Date    BNP 35 10/31/2019    HGBA1C 5.7 (H) 04/02/2024           Lab review: I have personally reviewed the laboratory result(s)      Problem List Items Addressed This Visit       Benign essential hypertension - Primary    Overview     BP well controlled on current medications   7/2024 BMP OK         Coronary artery calcification    Overview     Noted on 6/2020 chest CT   8/2023 stress test NL   No angina  On statin … intolerant of ASA as has stomach issues            Hyperlipidemia    Overview     On high-intensity statin -has CAC  8/2023 LDL=67   Recheck         Mitral regurgitation    Overview     Mild-moderate per  2/2021 echo   No murmur on exam   Stable per 8/2023 echo    Follow         PAC (premature atrial contraction)    Overview     Noted on prior EKG   Noted on exam today  Asymptomatic   Prior echo with NL LVEF            Lipids      Dao Hdez DO

## 2024-09-09 ENCOUNTER — LAB (OUTPATIENT)
Dept: LAB | Facility: LAB | Age: 79
End: 2024-09-09
Payer: MEDICARE

## 2024-09-09 DIAGNOSIS — E78.00 PURE HYPERCHOLESTEROLEMIA: ICD-10-CM

## 2024-09-09 LAB
CHOLEST SERPL-MCNC: 137 MG/DL (ref 0–199)
CHOLESTEROL/HDL RATIO: 2
HDLC SERPL-MCNC: 68.1 MG/DL
LDLC SERPL CALC-MCNC: 54 MG/DL
NON HDL CHOLESTEROL: 69 MG/DL (ref 0–149)
TRIGL SERPL-MCNC: 73 MG/DL (ref 0–149)
VLDL: 15 MG/DL (ref 0–40)

## 2024-09-09 PROCEDURE — 36415 COLL VENOUS BLD VENIPUNCTURE: CPT

## 2024-09-09 PROCEDURE — 80061 LIPID PANEL: CPT

## 2024-09-16 ENCOUNTER — OFFICE VISIT (OUTPATIENT)
Dept: ORTHOPEDIC SURGERY | Facility: CLINIC | Age: 79
End: 2024-09-16
Payer: MEDICARE

## 2024-09-16 VITALS — HEIGHT: 61 IN | BODY MASS INDEX: 23.03 KG/M2 | WEIGHT: 122 LBS

## 2024-09-16 DIAGNOSIS — M25.561 BILATERAL CHRONIC KNEE PAIN: ICD-10-CM

## 2024-09-16 DIAGNOSIS — G89.29 BILATERAL CHRONIC KNEE PAIN: ICD-10-CM

## 2024-09-16 DIAGNOSIS — M17.0 ARTHRITIS OF BOTH KNEES: Primary | ICD-10-CM

## 2024-09-16 DIAGNOSIS — M25.562 BILATERAL CHRONIC KNEE PAIN: ICD-10-CM

## 2024-09-16 PROCEDURE — 1159F MED LIST DOCD IN RCRD: CPT | Performed by: ORTHOPAEDIC SURGERY

## 2024-09-16 PROCEDURE — 1036F TOBACCO NON-USER: CPT | Performed by: ORTHOPAEDIC SURGERY

## 2024-09-16 PROCEDURE — 99213 OFFICE O/P EST LOW 20 MIN: CPT | Performed by: ORTHOPAEDIC SURGERY

## 2024-09-16 PROCEDURE — 1157F ADVNC CARE PLAN IN RCRD: CPT | Performed by: ORTHOPAEDIC SURGERY

## 2024-09-16 PROCEDURE — 2500000005 HC RX 250 GENERAL PHARMACY W/O HCPCS: Performed by: ORTHOPAEDIC SURGERY

## 2024-09-16 PROCEDURE — 1160F RVW MEDS BY RX/DR IN RCRD: CPT | Performed by: ORTHOPAEDIC SURGERY

## 2024-09-16 PROCEDURE — 20610 DRAIN/INJ JOINT/BURSA W/O US: CPT | Mod: 50 | Performed by: ORTHOPAEDIC SURGERY

## 2024-09-16 PROCEDURE — 2500000004 HC RX 250 GENERAL PHARMACY W/ HCPCS (ALT 636 FOR OP/ED): Performed by: ORTHOPAEDIC SURGERY

## 2024-09-16 RX ORDER — TRIAMCINOLONE ACETONIDE 40 MG/ML
40 INJECTION, SUSPENSION INTRA-ARTICULAR; INTRAMUSCULAR
Status: COMPLETED | OUTPATIENT
Start: 2024-09-16 | End: 2024-09-16

## 2024-09-16 RX ORDER — LIDOCAINE HYDROCHLORIDE 10 MG/ML
2 INJECTION, SOLUTION INFILTRATION; PERINEURAL
Status: COMPLETED | OUTPATIENT
Start: 2024-09-16 | End: 2024-09-16

## 2024-09-16 NOTE — PROGRESS NOTES
78-year-old is seen with bilateral knee pain.  She has been having persistent moderate throbbing pain in both knees.  Pain is worse with standing and walking going up and down stairs and getting up and down from a chair in and out of a car.    Pleasant and no acute distress. Walks with a antalgic gait. Stands with varus alignment of both knees. Right knee range of motion is 5-110°. There is a mild effusion. The knee is stable to varus and valgus stress Lachman and posterior drawer. There is generalized tenderness. Left knee range of motion is 5-110°. There is a mild effusion. The knee is stable to varus and valgus stress Lachman and posterior drawer. There is generalized tenderness. Both lower extremities are well perfused the skin is intact and muscle tone is adequate.    A discussion about knee arthritis was done.  Treatment options were reviewed.  The decision was made to proceed with cortisone injections.  She continue with an exercise program.  She is going to pain management for her back.  Follow-up based on her symptoms.          L Inj/Asp: bilateral knee on 9/16/2024 11:22 AM  Indications: pain  Details: 22 G needle, superolateral approach  Medications (Right): 40 mg triamcinolone acetonide 40 mg/mL; 2 mL lidocaine 10 mg/mL (1 %)  Medications (Left): 40 mg triamcinolone acetonide 40 mg/mL; 2 mL lidocaine 10 mg/mL (1 %)  Procedure, treatment alternatives, risks and benefits explained, specific risks discussed. Consent was given by the patient.

## 2024-09-20 ENCOUNTER — HOSPITAL ENCOUNTER (EMERGENCY)
Facility: HOSPITAL | Age: 79
Discharge: HOME | End: 2024-09-20
Payer: MEDICARE

## 2024-09-20 ENCOUNTER — APPOINTMENT (OUTPATIENT)
Dept: RADIOLOGY | Facility: HOSPITAL | Age: 79
End: 2024-09-20
Payer: MEDICARE

## 2024-09-20 ENCOUNTER — APPOINTMENT (OUTPATIENT)
Dept: CARDIOLOGY | Facility: HOSPITAL | Age: 79
End: 2024-09-20
Payer: MEDICARE

## 2024-09-20 VITALS
DIASTOLIC BLOOD PRESSURE: 68 MMHG | HEART RATE: 82 BPM | TEMPERATURE: 96.8 F | HEIGHT: 61 IN | SYSTOLIC BLOOD PRESSURE: 155 MMHG | BODY MASS INDEX: 24.55 KG/M2 | WEIGHT: 130 LBS | RESPIRATION RATE: 16 BRPM | OXYGEN SATURATION: 96 %

## 2024-09-20 DIAGNOSIS — T50.Z95A VACCINE REACTION, INITIAL ENCOUNTER: Primary | ICD-10-CM

## 2024-09-20 DIAGNOSIS — N30.00 ACUTE CYSTITIS WITHOUT HEMATURIA: ICD-10-CM

## 2024-09-20 LAB
ALBUMIN SERPL BCP-MCNC: 3.9 G/DL (ref 3.4–5)
ALP SERPL-CCNC: 62 U/L (ref 33–136)
ALT SERPL W P-5'-P-CCNC: 33 U/L (ref 7–45)
ANION GAP SERPL CALC-SCNC: 17 MMOL/L (ref 10–20)
APPEARANCE UR: CLEAR
AST SERPL W P-5'-P-CCNC: 28 U/L (ref 9–39)
BACTERIA #/AREA URNS AUTO: ABNORMAL /HPF
BASOPHILS # BLD AUTO: 0.01 X10*3/UL (ref 0–0.1)
BASOPHILS NFR BLD AUTO: 0.1 %
BILIRUB SERPL-MCNC: 0.9 MG/DL (ref 0–1.2)
BILIRUB UR STRIP.AUTO-MCNC: NEGATIVE MG/DL
BUN SERPL-MCNC: 31 MG/DL (ref 6–23)
CALCIUM SERPL-MCNC: 8.8 MG/DL (ref 8.6–10.3)
CARDIAC TROPONIN I PNL SERPL HS: 7 NG/L (ref 0–13)
CHLORIDE SERPL-SCNC: 104 MMOL/L (ref 98–107)
CO2 SERPL-SCNC: 16 MMOL/L (ref 21–32)
COLOR UR: ABNORMAL
CREAT SERPL-MCNC: 1.09 MG/DL (ref 0.5–1.05)
EGFRCR SERPLBLD CKD-EPI 2021: 52 ML/MIN/1.73M*2
EOSINOPHIL # BLD AUTO: 0 X10*3/UL (ref 0–0.4)
EOSINOPHIL NFR BLD AUTO: 0 %
ERYTHROCYTE [DISTWIDTH] IN BLOOD BY AUTOMATED COUNT: 13.7 % (ref 11.5–14.5)
FLUAV RNA RESP QL NAA+PROBE: NOT DETECTED
FLUBV RNA RESP QL NAA+PROBE: NOT DETECTED
GLUCOSE SERPL-MCNC: 109 MG/DL (ref 74–99)
GLUCOSE UR STRIP.AUTO-MCNC: NORMAL MG/DL
HCT VFR BLD AUTO: 47 % (ref 36–46)
HGB BLD-MCNC: 15.4 G/DL (ref 12–16)
IMM GRANULOCYTES # BLD AUTO: 0.06 X10*3/UL (ref 0–0.5)
IMM GRANULOCYTES NFR BLD AUTO: 0.8 % (ref 0–0.9)
KETONES UR STRIP.AUTO-MCNC: ABNORMAL MG/DL
LEUKOCYTE ESTERASE UR QL STRIP.AUTO: ABNORMAL
LYMPHOCYTES # BLD AUTO: 1.52 X10*3/UL (ref 0.8–3)
LYMPHOCYTES NFR BLD AUTO: 20.3 %
MAGNESIUM SERPL-MCNC: 2.19 MG/DL (ref 1.6–2.4)
MCH RBC QN AUTO: 28.8 PG (ref 26–34)
MCHC RBC AUTO-ENTMCNC: 32.8 G/DL (ref 32–36)
MCV RBC AUTO: 88 FL (ref 80–100)
MONOCYTES # BLD AUTO: 0.84 X10*3/UL (ref 0.05–0.8)
MONOCYTES NFR BLD AUTO: 11.2 %
NEUTROPHILS # BLD AUTO: 5.05 X10*3/UL (ref 1.6–5.5)
NEUTROPHILS NFR BLD AUTO: 67.6 %
NITRITE UR QL STRIP.AUTO: NEGATIVE
NRBC BLD-RTO: 0 /100 WBCS (ref 0–0)
PH UR STRIP.AUTO: 6 [PH]
PLATELET # BLD AUTO: 204 X10*3/UL (ref 150–450)
POTASSIUM SERPL-SCNC: 3.8 MMOL/L (ref 3.5–5.3)
PROT SERPL-MCNC: 6.9 G/DL (ref 6.4–8.2)
PROT UR STRIP.AUTO-MCNC: NEGATIVE MG/DL
RBC # BLD AUTO: 5.34 X10*6/UL (ref 4–5.2)
RBC # UR STRIP.AUTO: ABNORMAL /UL
RBC #/AREA URNS AUTO: ABNORMAL /HPF
SARS-COV-2 RNA RESP QL NAA+PROBE: NOT DETECTED
SODIUM SERPL-SCNC: 133 MMOL/L (ref 136–145)
SP GR UR STRIP.AUTO: 1.01
UROBILINOGEN UR STRIP.AUTO-MCNC: NORMAL MG/DL
WBC # BLD AUTO: 7.5 X10*3/UL (ref 4.4–11.3)
WBC #/AREA URNS AUTO: >50 /HPF
WBC CLUMPS #/AREA URNS AUTO: ABNORMAL /HPF

## 2024-09-20 PROCEDURE — 96375 TX/PRO/DX INJ NEW DRUG ADDON: CPT

## 2024-09-20 PROCEDURE — 80053 COMPREHEN METABOLIC PANEL: CPT | Performed by: PHYSICIAN ASSISTANT

## 2024-09-20 PROCEDURE — 81001 URINALYSIS AUTO W/SCOPE: CPT | Performed by: PHYSICIAN ASSISTANT

## 2024-09-20 PROCEDURE — 2500000004 HC RX 250 GENERAL PHARMACY W/ HCPCS (ALT 636 FOR OP/ED): Performed by: PHYSICIAN ASSISTANT

## 2024-09-20 PROCEDURE — 93005 ELECTROCARDIOGRAM TRACING: CPT

## 2024-09-20 PROCEDURE — 99285 EMERGENCY DEPT VISIT HI MDM: CPT | Mod: 25

## 2024-09-20 PROCEDURE — 70450 CT HEAD/BRAIN W/O DYE: CPT | Performed by: RADIOLOGY

## 2024-09-20 PROCEDURE — 36415 COLL VENOUS BLD VENIPUNCTURE: CPT | Performed by: PHYSICIAN ASSISTANT

## 2024-09-20 PROCEDURE — 87086 URINE CULTURE/COLONY COUNT: CPT | Mod: PARLAB | Performed by: PHYSICIAN ASSISTANT

## 2024-09-20 PROCEDURE — 96374 THER/PROPH/DIAG INJ IV PUSH: CPT

## 2024-09-20 PROCEDURE — 96361 HYDRATE IV INFUSION ADD-ON: CPT

## 2024-09-20 PROCEDURE — 70450 CT HEAD/BRAIN W/O DYE: CPT

## 2024-09-20 PROCEDURE — 71046 X-RAY EXAM CHEST 2 VIEWS: CPT

## 2024-09-20 PROCEDURE — 85025 COMPLETE CBC W/AUTO DIFF WBC: CPT | Performed by: PHYSICIAN ASSISTANT

## 2024-09-20 PROCEDURE — 99284 EMERGENCY DEPT VISIT MOD MDM: CPT | Mod: 25

## 2024-09-20 PROCEDURE — 71046 X-RAY EXAM CHEST 2 VIEWS: CPT | Mod: FOREIGN READ | Performed by: RADIOLOGY

## 2024-09-20 PROCEDURE — 87635 SARS-COV-2 COVID-19 AMP PRB: CPT | Performed by: PHYSICIAN ASSISTANT

## 2024-09-20 PROCEDURE — 84484 ASSAY OF TROPONIN QUANT: CPT | Performed by: PHYSICIAN ASSISTANT

## 2024-09-20 PROCEDURE — 83735 ASSAY OF MAGNESIUM: CPT | Performed by: PHYSICIAN ASSISTANT

## 2024-09-20 PROCEDURE — 2500000001 HC RX 250 WO HCPCS SELF ADMINISTERED DRUGS (ALT 637 FOR MEDICARE OP): Performed by: PHYSICIAN ASSISTANT

## 2024-09-20 RX ORDER — CEPHALEXIN 500 MG/1
500 CAPSULE ORAL ONCE
Status: COMPLETED | OUTPATIENT
Start: 2024-09-20 | End: 2024-09-20

## 2024-09-20 RX ORDER — CEPHALEXIN 500 MG/1
500 CAPSULE ORAL 4 TIMES DAILY
Qty: 28 CAPSULE | Refills: 0 | Status: SHIPPED | OUTPATIENT
Start: 2024-09-20 | End: 2024-09-27

## 2024-09-20 RX ORDER — ONDANSETRON 4 MG/1
4 TABLET, ORALLY DISINTEGRATING ORAL EVERY 8 HOURS PRN
Qty: 15 TABLET | Refills: 0 | Status: SHIPPED | OUTPATIENT
Start: 2024-09-20 | End: 2024-09-25

## 2024-09-20 RX ORDER — KETOROLAC TROMETHAMINE 30 MG/ML
15 INJECTION, SOLUTION INTRAMUSCULAR; INTRAVENOUS ONCE
Status: COMPLETED | OUTPATIENT
Start: 2024-09-20 | End: 2024-09-20

## 2024-09-20 RX ORDER — ONDANSETRON HYDROCHLORIDE 2 MG/ML
4 INJECTION, SOLUTION INTRAVENOUS ONCE
Status: COMPLETED | OUTPATIENT
Start: 2024-09-20 | End: 2024-09-20

## 2024-09-20 ASSESSMENT — PAIN DESCRIPTION - PAIN TYPE
TYPE: ACUTE PAIN
TYPE: ACUTE PAIN

## 2024-09-20 ASSESSMENT — PAIN SCALES - GENERAL
PAINLEVEL_OUTOF10: 6
PAINLEVEL_OUTOF10: 5 - MODERATE PAIN
PAINLEVEL_OUTOF10: 0 - NO PAIN
PAINLEVEL_OUTOF10: 6

## 2024-09-20 ASSESSMENT — PAIN - FUNCTIONAL ASSESSMENT
PAIN_FUNCTIONAL_ASSESSMENT: 0-10
PAIN_FUNCTIONAL_ASSESSMENT: 0-10

## 2024-09-20 ASSESSMENT — COLUMBIA-SUICIDE SEVERITY RATING SCALE - C-SSRS
2. HAVE YOU ACTUALLY HAD ANY THOUGHTS OF KILLING YOURSELF?: NO
1. IN THE PAST MONTH, HAVE YOU WISHED YOU WERE DEAD OR WISHED YOU COULD GO TO SLEEP AND NOT WAKE UP?: NO
6. HAVE YOU EVER DONE ANYTHING, STARTED TO DO ANYTHING, OR PREPARED TO DO ANYTHING TO END YOUR LIFE?: NO

## 2024-09-20 ASSESSMENT — PAIN DESCRIPTION - LOCATION: LOCATION: HEAD

## 2024-09-20 NOTE — ED PROVIDER NOTES
[facility]   Chief Complaint   Patient presents with    Flu Symptoms     Pt BIBA for flu like symptoms after getting her flu shot on Monday. States that she had a headache since and her dr told her to come in       [facility]  78 y.o. female with PMH significant for Anxiety, Depression, Diabetes, GERD, and HTN presenting to the emergency department for flu like symptoms after receiving the flu vaccine earlier this week.  She states been mildly nauseated body achy and a mild headache that is actually getting better.  States she had called her doctor from [facility] Who said if it does not get better by Friday she should go to the emergency room.  She presents here because she is not eating or drinking so much either.  No vomiting no abdominal pain      Patient History         Past Surgical History:   Procedure Laterality Date    CHOLECYSTECTOMY  2000    HYSTERECTOMY  1999    OTHER SURGICAL HISTORY  04/13/2022    Lumbar vertebral fusion     Family History   Problem Relation Name Age of Onset    Heart attack Mother      Heart attack Father       Social History     Tobacco Use    Smoking status: Never    Smokeless tobacco: Never   Substance Use Topics    Alcohol use: Yes     Comment: RARELY    Drug use: Never       Physical Exam   ED Triage Vitals   Temp Pulse Resp BP   -- -- -- --      SpO2 Temp src Heart Rate Source Patient Position   -- -- -- --      BP Location FiO2 (%)     -- --       Physical Exam    Reviewed family history social history and allergies and were noncontributory to current problem.    Review of systems as noted in history of present illness  otherwise negative. All other systems were reviewed and negative.     PMHX: Chronic conditions: reviewd in EMR, relevant history noted in HPI                Surgeries, hospitalizations: reviewed in EMR , relevant history noted in HPI                Medications: reviewed in EMR, relevant history noted in HPI                Allergies: reviewed in EMR, relevant history  noted in HPI      PHYSICAL EXAM:    GENERAL/ CONSTITUTIONAL: Vitals noted, no distress. Alert and oriented  x 3. Non-toxic.  No Drooling or stridor .   SlightlyAnxious    HEAD: Normocephalic Atraumatic    EENT: TMs clear. Posterior oropharynx unremarkable. No meningismus. No LAD.  No exudate present.      EYES: PERRLA EOMI     NECK: Supple. Nontender. No midline tenderness.  Full range of motion    CARDIAC: Regular, rate, rhythm. No murmurs rubs or gallops. No JVD    PULMONARY: Lungs clear bilaterally with good aeration. No wheezes rales or rhonchi. No respiratory distress.     GI: Soft, . Nontender. No peritoneal signs. Normoactive bowel sounds. No pulsatile masses.  No guarding or rebound    EXTREMITIES/MUSCULOSKELTAL: No peripheral edema. Negative Homans bilaterally, NVIT, dorsal pedis pulses +2 /4 equal. FROM in all extremities and equal.     SKIN: No rash. Intact.     NEURO: No focal neurologic deficits,     PSYCH: appropriate mood and affect    MEDICAL DECISION MAKING:      ED Course & MDM   Diagnoses as of 09/20/24 1930   Vaccine reaction, initial encounter     Basic labs ordered Zofran given fluids given COVID screen.  Anticipate after these measures patient will be improved and will be home-going.         No data recorded     Sonia Coma Scale Score: 15 (09/20/24 1904 : Joby Jiang RN)                           Medical Decision Making  Case will be signed out to Shady Carvalho PA-C       Procedure  Procedures     Nita Beltran PA-C  09/20/24 1930

## 2024-09-21 LAB — HOLD SPECIMEN: NORMAL

## 2024-09-21 NOTE — DISCHARGE INSTRUCTIONS
Please follow with your PCP in 2 to 3 days for reassessment of your symptoms.  It appears that you have a urinary tract infection.  This is treated with antibiotics.  Start this tomorrow morning.  You were given your first dose in the ER.  Your lab work and CT imaging was normal today.  I believe you also had some symptoms due to your flu vaccination.  This also major weakness worse.  I recommend you drink plenty of fluids and try to eat daily to improve your weakness  And to improve your headache.  You can continue with Tylenol or ibuprofen for pain control.  Return to the ER immediately if your symptoms change or worsen.

## 2024-09-21 NOTE — PROGRESS NOTES
Case was signed out to me by Nita Beltran at 1945.  I was to review the labs.    I discussed with the patient and personally assessed the patient.  She reports that she received her flu vaccine 5 days ago and ever since she has not been feeling well.  She reports that she has been having a lot of nausea, headaches, generalized weakness, increased acid reflux, and sweats.  She denies any chest pains or shortness of breath.  She denies any abdominal pains or diarrhea.  She denies any fevers.  She reports that she discussed this with her PCP who recommended she go to the ER for further assessment.  She is been taking Tylenol for the headache which seems to improve the headache however continues to return.  She rates her headache a 7 out of 10.  She reports that sometimes is on the right side of her head and sometimes on the left side of her head.  She not had any vision change.  She denies any upper or lower extremity numbness or tingling.  She denies unilateral weakness.  She denies any slurred speech or confusion.  She denies recent head trauma or injury.  She reports that she is also had decreased appetite since receiving the flu vaccination.  She has no further complaints.      Physical examination:  Constitutional: Elderly but it appears generally well.  No acute distress.  Not ill or toxic appearing.  ENT: Lips slightly dry.  Cardiac: Regular rate and rhythm.  No peripheral edema.  Pulmonary: Lungs clear to auscultation.  Breath sounds nonlabored.  Neurologic: Alert and oriented x 3.  No focal deficits.  Cranial nerves II through XII intact.  Normal coordination with finger-nose testing.  Normal motor and normal sensation. NIH 0.    Medical decision making:  Patient did receive IV fluids and IV Zofran by previous provider.  She does report that this improved her nausea but she still has a mild headache.  The headache is what seems to bother her the most.  Will obtain CT imaging of the head and also add on  "troponin, EKG, and chest x-ray due to her reported increased \"acid reflux \".    I reviewed the labs from today.  No leukocytosis or leukopenia.  H&H stable.  BUN 31 with a creatinine 1.09.  Sodium 133.  Bicarb 16.  Glucose 109.  COVID-negative.  Magnesium negative.  Troponin negative.  Influenza testing negative. Chest x-ray showing no acute cardiopulmonary process.  CT of the head showing no acute intracranial hemorrhage or mass.  She was given IV Toradol for her headache.  EKG reviewed by myself showing and ED attending showing sinus bradycardia.  Ventricular rate 58 bpm.  No acute ST elevations or depressions.  Inverted T waves in V1 through V3.  PAC noted.  Urinalysis is positive for UTI.  I reassessed the patient and she is resting comfortably.  She remained neurologically intact.  Of low suspicion for subarachnoid hemorrhage at this time.  Believe that she is a little bit dehydrated which could be attributing to her headache.  I also believe that the flu vaccine attributed to some of her symptoms.  She was also found to have a urinary tract infection.  I discussed all these findings with the patient.  She verbalized her understanding.  She reports feeling comfortable going home.  She will continue taking Tylenol or ibuprofen for headache.  I do recommend she drink fluids as I believe that this will help her headache.   she will be started on Keflex 500 mg 4 times daily for the next 7 days to treat the UTI.  She will also be given Zofran 4 mg and ODT for her nausea.  She was given her first dose of antibiotics in the ER.  I recommend she call her PCP in the next 2 to 3 days for reassessment of symptoms.  Strict return precautions discussed with her.  She verbalized understanding and agreed to plan of care.  She was discharged home in stable condition.    Diagnosis: UTI, generalized weakness  Plan: Discharge with close follow-up with PCP, antibiotics  "

## 2024-09-22 LAB — BACTERIA UR CULT: ABNORMAL

## 2024-09-24 LAB
ATRIAL RATE: 58 BPM
BACTERIA UR CULT: ABNORMAL
P AXIS: 68 DEGREES
P OFFSET: 216 MS
P ONSET: 159 MS
PR INTERVAL: 136 MS
Q ONSET: 227 MS
QRS COUNT: 10 BEATS
QRS DURATION: 70 MS
QT INTERVAL: 434 MS
QTC CALCULATION(BAZETT): 426 MS
QTC FREDERICIA: 428 MS
R AXIS: 79 DEGREES
T AXIS: 57 DEGREES
T OFFSET: 444 MS
VENTRICULAR RATE: 58 BPM

## 2024-09-25 ENCOUNTER — TELEPHONE (OUTPATIENT)
Dept: PHARMACY | Facility: HOSPITAL | Age: 79
End: 2024-09-25
Payer: MEDICARE

## 2024-09-25 NOTE — PROGRESS NOTES
EDPD Note: Rapid Result Review    Reviewed Mrs. Lauren Perez 's chart regarding a positive urine culture/result that was taken during their recent emergency room visit. The patient was not told about these results prior to leaving the emergency department. Therefore, patient was contacted and given proper education.     Pt presented to ED with flu like symptoms, body-ache and headache. Pt was discharged with Keflex QID x 7 days, which was not appropriate. Urine culture came back with intermed resistance for Cefazolin. Pt reports feeling weakness, nausea, headache. She has seen a PCP for her UTI results but PCP did not see her urine culture results. Pt states she would prefer to see her PCP about her UTI.    Susceptibility data from last 90 days.  Collected Specimen Info Organism Amoxicillin/Clavulanate Ampicillin Ampicillin/Sulbactam Cefazolin Cefazolin (uncomplicated UTIs only) Ceftriaxone Ciprofloxacin Gentamicin Nitrofurantoin Piperacillin/Tazobactam   09/20/24 Urine from Clean Catch/Voided Escherichia coli  S  R  R  I  S  S  R  S  S  S   07/27/24 Urine from Clean Catch/Voided Escherichia coli  S  R  R  I  S  S  R  S  S  S     Collected Specimen Info Organism Trimethoprim/Sulfamethoxazole   09/20/24 Urine from Clean Catch/Voided Escherichia coli  S   07/27/24 Urine from Clean Catch/Voided Escherichia coli  S       No further follow up needed from EDPD Team.     ERNIE PRATHER

## 2024-10-30 ENCOUNTER — HOSPITAL ENCOUNTER (EMERGENCY)
Facility: HOSPITAL | Age: 79
Discharge: HOME | End: 2024-10-30
Payer: MEDICARE

## 2024-10-30 ENCOUNTER — APPOINTMENT (OUTPATIENT)
Dept: RADIOLOGY | Facility: HOSPITAL | Age: 79
End: 2024-10-30
Payer: MEDICARE

## 2024-10-30 ENCOUNTER — APPOINTMENT (OUTPATIENT)
Dept: CARDIOLOGY | Facility: HOSPITAL | Age: 79
End: 2024-10-30
Payer: MEDICARE

## 2024-10-30 VITALS
SYSTOLIC BLOOD PRESSURE: 151 MMHG | HEART RATE: 89 BPM | TEMPERATURE: 97.9 F | OXYGEN SATURATION: 98 % | BODY MASS INDEX: 26.43 KG/M2 | RESPIRATION RATE: 16 BRPM | HEIGHT: 61 IN | WEIGHT: 140 LBS | DIASTOLIC BLOOD PRESSURE: 67 MMHG

## 2024-10-30 DIAGNOSIS — S09.90XA CLOSED HEAD INJURY, INITIAL ENCOUNTER: ICD-10-CM

## 2024-10-30 DIAGNOSIS — S30.0XXA CONTUSION OF BUTTOCK, INITIAL ENCOUNTER: ICD-10-CM

## 2024-10-30 DIAGNOSIS — R55 SYNCOPE, UNSPECIFIED SYNCOPE TYPE: Primary | ICD-10-CM

## 2024-10-30 LAB
ALBUMIN SERPL BCP-MCNC: 1.8 G/DL (ref 3.4–5)
ALBUMIN SERPL BCP-MCNC: 3.8 G/DL (ref 3.4–5)
ALP SERPL-CCNC: 27 U/L (ref 33–136)
ALP SERPL-CCNC: 65 U/L (ref 33–136)
ALT SERPL W P-5'-P-CCNC: 18 U/L (ref 7–45)
ALT SERPL W P-5'-P-CCNC: 8 U/L (ref 7–45)
AMORPH CRY #/AREA UR COMP ASSIST: ABNORMAL /HPF
ANION GAP SERPL CALC-SCNC: 14 MMOL/L (ref 10–20)
ANION GAP SERPL CALC-SCNC: 9 MMOL/L (ref 10–20)
APPEARANCE UR: CLEAR
AST SERPL W P-5'-P-CCNC: 10 U/L (ref 9–39)
AST SERPL W P-5'-P-CCNC: 17 U/L (ref 9–39)
BACTERIA #/AREA URNS AUTO: ABNORMAL /HPF
BASOPHILS # BLD AUTO: 0.04 X10*3/UL (ref 0–0.1)
BASOPHILS NFR BLD AUTO: 0.5 %
BILIRUB SERPL-MCNC: 0.3 MG/DL (ref 0–1.2)
BILIRUB SERPL-MCNC: 0.7 MG/DL (ref 0–1.2)
BILIRUB UR STRIP.AUTO-MCNC: NEGATIVE MG/DL
BUN SERPL-MCNC: 10 MG/DL (ref 6–23)
BUN SERPL-MCNC: 18 MG/DL (ref 6–23)
CALCIUM SERPL-MCNC: 8.9 MG/DL (ref 8.6–10.3)
CALCIUM SERPL-MCNC: <4 MG/DL (ref 8.6–10.3)
CARDIAC TROPONIN I PNL SERPL HS: 4 NG/L (ref 0–13)
CHLORIDE SERPL-SCNC: 108 MMOL/L (ref 98–107)
CHLORIDE SERPL-SCNC: 127 MMOL/L (ref 98–107)
CO2 SERPL-SCNC: 12 MMOL/L (ref 21–32)
CO2 SERPL-SCNC: 23 MMOL/L (ref 21–32)
COLOR UR: ABNORMAL
CREAT SERPL-MCNC: 0.4 MG/DL (ref 0.5–1.05)
CREAT SERPL-MCNC: 0.93 MG/DL (ref 0.5–1.05)
EGFRCR SERPLBLD CKD-EPI 2021: 63 ML/MIN/1.73M*2
EGFRCR SERPLBLD CKD-EPI 2021: >90 ML/MIN/1.73M*2
EOSINOPHIL # BLD AUTO: 0.07 X10*3/UL (ref 0–0.4)
EOSINOPHIL NFR BLD AUTO: 0.8 %
ERYTHROCYTE [DISTWIDTH] IN BLOOD BY AUTOMATED COUNT: 15.7 % (ref 11.5–14.5)
GLUCOSE SERPL-MCNC: 55 MG/DL (ref 74–99)
GLUCOSE SERPL-MCNC: 95 MG/DL (ref 74–99)
GLUCOSE UR STRIP.AUTO-MCNC: NORMAL MG/DL
HCT VFR BLD AUTO: 41.1 % (ref 36–46)
HGB BLD-MCNC: 12.9 G/DL (ref 12–16)
HYALINE CASTS #/AREA URNS AUTO: ABNORMAL /LPF
IMM GRANULOCYTES # BLD AUTO: 0.16 X10*3/UL (ref 0–0.5)
IMM GRANULOCYTES NFR BLD AUTO: 1.9 % (ref 0–0.9)
KETONES UR STRIP.AUTO-MCNC: ABNORMAL MG/DL
LEUKOCYTE ESTERASE UR QL STRIP.AUTO: ABNORMAL
LYMPHOCYTES # BLD AUTO: 2.18 X10*3/UL (ref 0.8–3)
LYMPHOCYTES NFR BLD AUTO: 25.4 %
MAGNESIUM SERPL-MCNC: 0.91 MG/DL (ref 1.6–2.4)
MAGNESIUM SERPL-MCNC: 1.96 MG/DL (ref 1.6–2.4)
MCH RBC QN AUTO: 29.3 PG (ref 26–34)
MCHC RBC AUTO-ENTMCNC: 31.4 G/DL (ref 32–36)
MCV RBC AUTO: 93 FL (ref 80–100)
MONOCYTES # BLD AUTO: 1.04 X10*3/UL (ref 0.05–0.8)
MONOCYTES NFR BLD AUTO: 12.1 %
MUCOUS THREADS #/AREA URNS AUTO: ABNORMAL /LPF
NEUTROPHILS # BLD AUTO: 5.1 X10*3/UL (ref 1.6–5.5)
NEUTROPHILS NFR BLD AUTO: 59.3 %
NITRITE UR QL STRIP.AUTO: NEGATIVE
NRBC BLD-RTO: 0 /100 WBCS (ref 0–0)
PH UR STRIP.AUTO: 5.5 [PH]
PLATELET # BLD AUTO: 164 X10*3/UL (ref 150–450)
POTASSIUM SERPL-SCNC: 1.8 MMOL/L (ref 3.5–5.3)
POTASSIUM SERPL-SCNC: 3.6 MMOL/L (ref 3.5–5.3)
PROT SERPL-MCNC: 3 G/DL (ref 6.4–8.2)
PROT SERPL-MCNC: 6.6 G/DL (ref 6.4–8.2)
PROT UR STRIP.AUTO-MCNC: NEGATIVE MG/DL
RBC # BLD AUTO: 4.41 X10*6/UL (ref 4–5.2)
RBC # UR STRIP.AUTO: ABNORMAL /UL
RBC #/AREA URNS AUTO: ABNORMAL /HPF
SARS-COV-2 RNA RESP QL NAA+PROBE: NOT DETECTED
SODIUM SERPL-SCNC: 141 MMOL/L (ref 136–145)
SODIUM SERPL-SCNC: 146 MMOL/L (ref 136–145)
SP GR UR STRIP.AUTO: 1.01
UROBILINOGEN UR STRIP.AUTO-MCNC: NORMAL MG/DL
WBC # BLD AUTO: 8.6 X10*3/UL (ref 4.4–11.3)
WBC #/AREA URNS AUTO: ABNORMAL /HPF

## 2024-10-30 PROCEDURE — 71046 X-RAY EXAM CHEST 2 VIEWS: CPT | Performed by: RADIOLOGY

## 2024-10-30 PROCEDURE — 72125 CT NECK SPINE W/O DYE: CPT

## 2024-10-30 PROCEDURE — 87635 SARS-COV-2 COVID-19 AMP PRB: CPT | Performed by: PHYSICIAN ASSISTANT

## 2024-10-30 PROCEDURE — 85025 COMPLETE CBC W/AUTO DIFF WBC: CPT | Performed by: PHYSICIAN ASSISTANT

## 2024-10-30 PROCEDURE — 84075 ASSAY ALKALINE PHOSPHATASE: CPT | Performed by: PHYSICIAN ASSISTANT

## 2024-10-30 PROCEDURE — 36415 COLL VENOUS BLD VENIPUNCTURE: CPT | Performed by: PHYSICIAN ASSISTANT

## 2024-10-30 PROCEDURE — 87086 URINE CULTURE/COLONY COUNT: CPT | Mod: PARLAB | Performed by: PHYSICIAN ASSISTANT

## 2024-10-30 PROCEDURE — 72170 X-RAY EXAM OF PELVIS: CPT

## 2024-10-30 PROCEDURE — 93005 ELECTROCARDIOGRAM TRACING: CPT

## 2024-10-30 PROCEDURE — 72170 X-RAY EXAM OF PELVIS: CPT | Performed by: RADIOLOGY

## 2024-10-30 PROCEDURE — 71046 X-RAY EXAM CHEST 2 VIEWS: CPT

## 2024-10-30 PROCEDURE — 72125 CT NECK SPINE W/O DYE: CPT | Performed by: RADIOLOGY

## 2024-10-30 PROCEDURE — 83735 ASSAY OF MAGNESIUM: CPT | Performed by: PHYSICIAN ASSISTANT

## 2024-10-30 PROCEDURE — 81001 URINALYSIS AUTO W/SCOPE: CPT | Performed by: PHYSICIAN ASSISTANT

## 2024-10-30 PROCEDURE — 84484 ASSAY OF TROPONIN QUANT: CPT | Performed by: PHYSICIAN ASSISTANT

## 2024-10-30 PROCEDURE — 70450 CT HEAD/BRAIN W/O DYE: CPT

## 2024-10-30 PROCEDURE — 99285 EMERGENCY DEPT VISIT HI MDM: CPT | Mod: 25

## 2024-10-30 PROCEDURE — 70450 CT HEAD/BRAIN W/O DYE: CPT | Performed by: RADIOLOGY

## 2024-10-30 ASSESSMENT — LIFESTYLE VARIABLES
HAVE PEOPLE ANNOYED YOU BY CRITICIZING YOUR DRINKING: NO
HAVE YOU EVER FELT YOU SHOULD CUT DOWN ON YOUR DRINKING: NO
EVER HAD A DRINK FIRST THING IN THE MORNING TO STEADY YOUR NERVES TO GET RID OF A HANGOVER: NO
EVER FELT BAD OR GUILTY ABOUT YOUR DRINKING: NO
TOTAL SCORE: 0

## 2024-10-30 ASSESSMENT — PAIN SCALES - GENERAL: PAINLEVEL_OUTOF10: 0 - NO PAIN

## 2024-10-30 ASSESSMENT — PAIN - FUNCTIONAL ASSESSMENT: PAIN_FUNCTIONAL_ASSESSMENT: 0-10

## 2024-10-31 LAB
ATRIAL RATE: 81 BPM
BACTERIA UR CULT: NORMAL
HOLD SPECIMEN: NORMAL
P AXIS: 59 DEGREES
P OFFSET: 204 MS
P ONSET: 151 MS
PR INTERVAL: 148 MS
Q ONSET: 225 MS
QRS COUNT: 13 BEATS
QRS DURATION: 74 MS
QT INTERVAL: 408 MS
QTC CALCULATION(BAZETT): 473 MS
QTC FREDERICIA: 450 MS
R AXIS: 43 DEGREES
T AXIS: 36 DEGREES
T OFFSET: 429 MS
VENTRICULAR RATE: 81 BPM

## 2024-11-13 LAB
ATRIAL RATE: 81 BPM
P AXIS: 59 DEGREES
P OFFSET: 204 MS
P ONSET: 151 MS
PR INTERVAL: 148 MS
Q ONSET: 225 MS
QRS COUNT: 13 BEATS
QRS DURATION: 74 MS
QT INTERVAL: 408 MS
QTC CALCULATION(BAZETT): 473 MS
QTC FREDERICIA: 450 MS
R AXIS: 43 DEGREES
T AXIS: 36 DEGREES
T OFFSET: 429 MS
VENTRICULAR RATE: 81 BPM

## 2024-12-24 ENCOUNTER — APPOINTMENT (OUTPATIENT)
Dept: ORTHOPEDIC SURGERY | Facility: CLINIC | Age: 79
End: 2024-12-24
Payer: MEDICARE

## 2024-12-26 ENCOUNTER — EXTERNAL HOSPITAL ADMISSION (OUTPATIENT)
Dept: CARDIOLOGY | Facility: CLINIC | Age: 79
End: 2024-12-26
Payer: MEDICARE

## 2025-01-28 ENCOUNTER — OFFICE VISIT (OUTPATIENT)
Dept: ORTHOPEDIC SURGERY | Facility: CLINIC | Age: 80
End: 2025-01-28
Payer: MEDICARE

## 2025-01-28 VITALS — BODY MASS INDEX: 24.55 KG/M2 | HEIGHT: 61 IN | WEIGHT: 130 LBS

## 2025-01-28 DIAGNOSIS — M17.0 ARTHRITIS OF BOTH KNEES: Primary | ICD-10-CM

## 2025-01-28 DIAGNOSIS — M25.562 BILATERAL CHRONIC KNEE PAIN: ICD-10-CM

## 2025-01-28 DIAGNOSIS — G89.29 BILATERAL CHRONIC KNEE PAIN: ICD-10-CM

## 2025-01-28 DIAGNOSIS — M25.561 BILATERAL CHRONIC KNEE PAIN: ICD-10-CM

## 2025-01-28 PROCEDURE — 1036F TOBACCO NON-USER: CPT | Performed by: ORTHOPAEDIC SURGERY

## 2025-01-28 PROCEDURE — 1157F ADVNC CARE PLAN IN RCRD: CPT | Performed by: ORTHOPAEDIC SURGERY

## 2025-01-28 PROCEDURE — 2500000004 HC RX 250 GENERAL PHARMACY W/ HCPCS (ALT 636 FOR OP/ED): Performed by: ORTHOPAEDIC SURGERY

## 2025-01-28 PROCEDURE — 20610 DRAIN/INJ JOINT/BURSA W/O US: CPT | Mod: 50 | Performed by: ORTHOPAEDIC SURGERY

## 2025-01-28 PROCEDURE — 99213 OFFICE O/P EST LOW 20 MIN: CPT | Mod: 25 | Performed by: ORTHOPAEDIC SURGERY

## 2025-01-28 PROCEDURE — 1160F RVW MEDS BY RX/DR IN RCRD: CPT | Performed by: ORTHOPAEDIC SURGERY

## 2025-01-28 PROCEDURE — 99213 OFFICE O/P EST LOW 20 MIN: CPT | Performed by: ORTHOPAEDIC SURGERY

## 2025-01-28 PROCEDURE — 1159F MED LIST DOCD IN RCRD: CPT | Performed by: ORTHOPAEDIC SURGERY

## 2025-01-28 RX ORDER — LIDOCAINE HYDROCHLORIDE 10 MG/ML
2 INJECTION, SOLUTION INFILTRATION; PERINEURAL
Status: COMPLETED | OUTPATIENT
Start: 2025-01-28 | End: 2025-01-28

## 2025-01-28 RX ORDER — TRIAMCINOLONE ACETONIDE 40 MG/ML
40 INJECTION, SUSPENSION INTRA-ARTICULAR; INTRAMUSCULAR
Status: COMPLETED | OUTPATIENT
Start: 2025-01-28 | End: 2025-01-28

## 2025-01-28 RX ADMIN — LIDOCAINE HYDROCHLORIDE 2 ML: 10 INJECTION, SOLUTION INFILTRATION; PERINEURAL at 09:26

## 2025-01-28 RX ADMIN — TRIAMCINOLONE ACETONIDE 40 MG: 40 INJECTION, SUSPENSION INTRA-ARTICULAR; INTRAMUSCULAR at 09:26

## 2025-01-28 ASSESSMENT — ENCOUNTER SYMPTOMS
LOSS OF SENSATION IN FEET: 0
DEPRESSION: 0
OCCASIONAL FEELINGS OF UNSTEADINESS: 1

## 2025-01-28 NOTE — PROGRESS NOTES
79-year-old is seen in follow-up with bilateral knee pain.  She has been having persistent moderate throbbing pain in both knees.  Pain is worse with standing and walking going up and down stairs and getting up and down from a chair in and out of a car.  She is in physical therapy.    Pleasant and no acute distress. Walks with a antalgic gait. Stands with varus alignment of both knees. Right knee range of motion is 5-110°. There is a mild effusion. The knee is stable to varus and valgus stress Lachman and posterior drawer. There is generalized tenderness. Left knee range of motion is 5-110°. There is a mild effusion. The knee is stable to varus and valgus stress Lachman and posterior drawer. There is generalized tenderness. Both lower extremities are well perfused the skin is intact and muscle tone is adequate.    A discussion about knee arthritis was done.  Treatment options were reviewed.  The decision was made to proceed with cortisone injections.  She will continue with the physical therapy exercises.  She will have updated x-rays of both knees at her next visit.      L Inj/Asp: bilateral knee on 1/28/2025 9:26 AM  Indications: pain  Details: 22 G needle, superolateral approach  Medications (Right): 40 mg triamcinolone acetonide 40 mg/mL; 2 mL lidocaine 10 mg/mL (1 %)  Medications (Left): 40 mg triamcinolone acetonide 40 mg/mL; 2 mL lidocaine 10 mg/mL (1 %)  Procedure, treatment alternatives, risks and benefits explained, specific risks discussed. Consent was given by the patient.

## 2025-03-05 ENCOUNTER — OFFICE VISIT (OUTPATIENT)
Dept: CARDIOLOGY | Facility: CLINIC | Age: 80
End: 2025-03-05
Payer: MEDICARE

## 2025-03-05 VITALS
DIASTOLIC BLOOD PRESSURE: 60 MMHG | SYSTOLIC BLOOD PRESSURE: 122 MMHG | BODY MASS INDEX: 25.34 KG/M2 | HEART RATE: 47 BPM | HEIGHT: 61 IN | WEIGHT: 134.2 LBS | OXYGEN SATURATION: 96 %

## 2025-03-05 DIAGNOSIS — I34.0 MITRAL VALVE INSUFFICIENCY, UNSPECIFIED ETIOLOGY: ICD-10-CM

## 2025-03-05 DIAGNOSIS — I49.1 PAC (PREMATURE ATRIAL CONTRACTION): ICD-10-CM

## 2025-03-05 DIAGNOSIS — I25.10 CORONARY ARTERY CALCIFICATION: ICD-10-CM

## 2025-03-05 DIAGNOSIS — E78.00 PURE HYPERCHOLESTEROLEMIA: ICD-10-CM

## 2025-03-05 DIAGNOSIS — I10 BENIGN ESSENTIAL HYPERTENSION: Primary | ICD-10-CM

## 2025-03-05 DIAGNOSIS — R06.02 SOB (SHORTNESS OF BREATH) ON EXERTION: ICD-10-CM

## 2025-03-05 PROCEDURE — 99214 OFFICE O/P EST MOD 30 MIN: CPT | Performed by: INTERNAL MEDICINE

## 2025-03-05 PROCEDURE — 1160F RVW MEDS BY RX/DR IN RCRD: CPT | Performed by: INTERNAL MEDICINE

## 2025-03-05 PROCEDURE — 3078F DIAST BP <80 MM HG: CPT | Performed by: INTERNAL MEDICINE

## 2025-03-05 PROCEDURE — G2211 COMPLEX E/M VISIT ADD ON: HCPCS | Performed by: INTERNAL MEDICINE

## 2025-03-05 PROCEDURE — 3074F SYST BP LT 130 MM HG: CPT | Performed by: INTERNAL MEDICINE

## 2025-03-05 PROCEDURE — 1157F ADVNC CARE PLAN IN RCRD: CPT | Performed by: INTERNAL MEDICINE

## 2025-03-05 PROCEDURE — 1159F MED LIST DOCD IN RCRD: CPT | Performed by: INTERNAL MEDICINE

## 2025-03-05 RX ORDER — TRAMADOL HYDROCHLORIDE 50 MG/1
TABLET ORAL
COMMUNITY
Start: 2025-02-11

## 2025-03-05 RX ORDER — LEVALBUTEROL TARTRATE 45 UG/1
1-2 AEROSOL, METERED ORAL
COMMUNITY

## 2025-03-05 RX ORDER — ROSUVASTATIN CALCIUM 20 MG/1
20 TABLET, COATED ORAL DAILY
Qty: 90 TABLET | Refills: 3 | Status: SHIPPED | OUTPATIENT
Start: 2025-03-05 | End: 2026-03-05

## 2025-03-05 NOTE — PROGRESS NOTES
"Chief Complaint:   Follow-up (6 month)     History Of Present Illness:    Lauren Perez is a 79 y.o. female presenting with cardiovascular disease.  Gets \"jittery\" in the morning-takes clonazepam and feels better.  Occasional SOB-takes PRN inhaler  Patient denies chest pain/palpitations/dizziness/lightheadedness/edema/claudication    No regular exercise-but ha sbeen doing PT       Last Recorded Vitals:  Vitals:    03/05/25 1306 03/05/25 1348   BP: 98/62 122/60   BP Location: Left arm    Patient Position: Sitting    BP Cuff Size: Adult    Pulse: (!) 47    SpO2: 96%    Weight: 60.9 kg (134 lb 3.2 oz)    Height: 1.549 m (5' 1\")             Allergies:  Codeine, Fentanyl, Sulfamethoxazole-trimethoprim, and Ace inhibitors    Outpatient Medications:  Current Outpatient Medications   Medication Instructions    amLODIPine (Norvasc) 10 mg tablet 1 tablet, Daily    clonazePAM (KLONOPIN) 0.5 mg, 2 times daily    gabapentin (NEURONTIN) 300 mg, oral, 3 times daily    levalbuterol (Xopenex) 45 mcg/actuation inhaler 1-2 puffs, inhalation    montelukast (Singulair) 10 mg tablet     mv-mn/om3/dha/epa/fish/lut/savanna (OCUVITE ADULT 50 PLUS ORAL) Take as directed    oxyCODONE-acetaminophen (Percocet) 5-325 mg tablet 1 tablet, oral, Every 6 hours PRN    pantoprazole (ProtoNix) 40 mg EC tablet 1 tablet, Daily    rosuvastatin (CRESTOR) 20 mg, oral, Daily    traMADol (Ultram) 50 mg tablet TAKE ONE TABLET BY MOUTH TWICE DAILY AS NEEDED FOR moderate to severe PAIN    traZODone (Desyrel) 50 mg tablet        Physical Exam:  Constitutional:       General: Awake.      Appearance: Healthy appearance. Not in distress.   Neck:      Vascular: No JVR. JVD normal.   Pulmonary:      Effort: Pulmonary effort is normal.      Breath sounds: Normal breath sounds. No wheezing. No rhonchi. No rales.   Chest:      Chest wall: Not tender to palpatation.   Cardiovascular:      PMI at left midclavicular line. Normal rate. Frequent ectopic beats. Regular rhythm. " Normal S1. Normal S2.       Murmurs: There is no murmur.      No gallop.  No click. No rub.   Pulses:     Intact distal pulses.   Edema:     Peripheral edema absent.   Abdominal:      General: Bowel sounds are normal.      Palpations: Abdomen is soft.      Tenderness: There is no abdominal tenderness.   Musculoskeletal: Normal range of motion.         General: No tenderness. Skin:     General: Skin is warm and dry.   Neurological:      General: No focal deficit present.      Mental Status: Alert and oriented to person, place and time.          Last Labs:  CBC -  Lab Results   Component Value Date    WBC 8.6 10/30/2024    HGB 12.9 10/30/2024    HCT 41.1 10/30/2024    MCV 93 10/30/2024     10/30/2024       CMP -  Lab Results   Component Value Date    CALCIUM 8.9 10/30/2024    PROT 6.6 10/30/2024    ALBUMIN 3.8 10/30/2024    AST 17 10/30/2024    ALT 18 10/30/2024    ALKPHOS 65 10/30/2024    BILITOT 0.7 10/30/2024       LIPID PANEL -   Lab Results   Component Value Date    CHOL 137 09/09/2024    TRIG 73 09/09/2024    HDL 68.1 09/09/2024    CHHDL 2.0 09/09/2024    LDLF 67 08/03/2023    VLDL 15 09/09/2024    NHDL 69 09/09/2024     LDL=54 percent.  My upper chest  RENAL FUNCTION PANEL -   Lab Results   Component Value Date    GLUCOSE 95 10/30/2024     10/30/2024    K 3.6 10/30/2024     (H) 10/30/2024    CO2 23 10/30/2024    ANIONGAP 14 10/30/2024    BUN 18 10/30/2024    CREATININE 0.93 10/30/2024    CALCIUM 8.9 10/30/2024    ALBUMIN 3.8 10/30/2024         Lab Results   Component Value Date    BNP 35 10/31/2019    HGBA1C 5.7 (H) 04/02/2024           Lab review: I have personally reviewed the laboratory result(s)      Problem List Items Addressed This Visit       Benign essential hypertension - Primary    Overview     BP well controlled on current medications   10/2024 BMP OK         Coronary artery calcification    Overview     Noted on 6/2020 chest CT   8/2023 stress test NL   No angina  On statin …  intolerant of ASA as has stomach issues            Hyperlipidemia    Overview     On high-intensity statin -has CAC  8/2023 LDL=67   Recheck         Mitral regurgitation    Overview     Mild-moderate per 2/2021 echo   No murmur on exam   Stable per 8/2023 echo    Follow         PAC (premature atrial contraction)    Overview     Noted on prior EKG   Noted on exam today  Asymptomatic   Prior echo with NL LVEF    Prior stress with NL perfusion         SOB (shortness of breath) on exertion    Overview     History of SOB, which has appeared more pulmonary as had abnormal PFTs and has had improvement with nebulizer and had some fibrosis in lungs on CT … follows with Pulmonary   Followup 2/2021 PFTs with only small airway dz   Note: 9/2019 stress test with NL perfusion   NL 2/2021 echo with NL LVEF   Had bout of Covid 3/2023 which temporarily worsened SOB   8/2023 stress and echo OK   Follow                  Dao Hdez,

## 2025-03-18 ENCOUNTER — TELEPHONE (OUTPATIENT)
Dept: CARDIOLOGY | Facility: CLINIC | Age: 80
End: 2025-03-18
Payer: MEDICARE

## 2025-03-18 NOTE — TELEPHONE ENCOUNTER
Patient called requesting cardiac clearance and instructions re: holding Aspirin for upcoming pain injection with Dr. Sean Power.    Unable to locate fax clearance request form. Spoke with Benigno at Dr. Power's office and she will fax over request.    Spoke with patient and advised of above and of need to review with Dr. Hdez.

## 2025-04-29 ENCOUNTER — HOSPITAL ENCOUNTER (OUTPATIENT)
Dept: RADIOLOGY | Facility: CLINIC | Age: 80
Discharge: HOME | End: 2025-04-29
Payer: MEDICARE

## 2025-04-29 ENCOUNTER — OFFICE VISIT (OUTPATIENT)
Dept: ORTHOPEDIC SURGERY | Facility: CLINIC | Age: 80
End: 2025-04-29
Payer: MEDICARE

## 2025-04-29 DIAGNOSIS — M25.562 BILATERAL CHRONIC KNEE PAIN: ICD-10-CM

## 2025-04-29 DIAGNOSIS — M25.561 BILATERAL CHRONIC KNEE PAIN: ICD-10-CM

## 2025-04-29 DIAGNOSIS — M17.0 ARTHRITIS OF BOTH KNEES: ICD-10-CM

## 2025-04-29 DIAGNOSIS — M17.0 ARTHRITIS OF BOTH KNEES: Primary | ICD-10-CM

## 2025-04-29 DIAGNOSIS — G89.29 BILATERAL CHRONIC KNEE PAIN: ICD-10-CM

## 2025-04-29 PROCEDURE — 99213 OFFICE O/P EST LOW 20 MIN: CPT | Performed by: ORTHOPAEDIC SURGERY

## 2025-04-29 PROCEDURE — 73564 X-RAY EXAM KNEE 4 OR MORE: CPT | Mod: BILATERAL PROCEDURE | Performed by: RADIOLOGY

## 2025-04-29 PROCEDURE — 1036F TOBACCO NON-USER: CPT | Performed by: ORTHOPAEDIC SURGERY

## 2025-04-29 PROCEDURE — 1160F RVW MEDS BY RX/DR IN RCRD: CPT | Performed by: ORTHOPAEDIC SURGERY

## 2025-04-29 PROCEDURE — 99213 OFFICE O/P EST LOW 20 MIN: CPT | Mod: 25 | Performed by: ORTHOPAEDIC SURGERY

## 2025-04-29 PROCEDURE — 20610 DRAIN/INJ JOINT/BURSA W/O US: CPT | Mod: 50 | Performed by: ORTHOPAEDIC SURGERY

## 2025-04-29 PROCEDURE — 1159F MED LIST DOCD IN RCRD: CPT | Performed by: ORTHOPAEDIC SURGERY

## 2025-04-29 PROCEDURE — 1157F ADVNC CARE PLAN IN RCRD: CPT | Performed by: ORTHOPAEDIC SURGERY

## 2025-04-29 PROCEDURE — 2500000004 HC RX 250 GENERAL PHARMACY W/ HCPCS (ALT 636 FOR OP/ED): Performed by: ORTHOPAEDIC SURGERY

## 2025-04-29 PROCEDURE — 73564 X-RAY EXAM KNEE 4 OR MORE: CPT | Mod: 50

## 2025-04-29 RX ADMIN — TRIAMCINOLONE ACETONIDE 40 MG: 40 INJECTION, SUSPENSION INTRA-ARTICULAR; INTRAMUSCULAR at 08:08

## 2025-04-29 RX ADMIN — LIDOCAINE HYDROCHLORIDE 2 ML: 10 INJECTION, SOLUTION INFILTRATION; PERINEURAL at 08:08

## 2025-05-01 RX ORDER — LIDOCAINE HYDROCHLORIDE 10 MG/ML
2 INJECTION, SOLUTION INFILTRATION; PERINEURAL
Status: COMPLETED | OUTPATIENT
Start: 2025-04-29 | End: 2025-04-29

## 2025-05-01 RX ORDER — TRIAMCINOLONE ACETONIDE 40 MG/ML
40 INJECTION, SUSPENSION INTRA-ARTICULAR; INTRAMUSCULAR
Status: COMPLETED | OUTPATIENT
Start: 2025-04-29 | End: 2025-04-29

## 2025-05-01 NOTE — PROGRESS NOTES
79-year-old is seen with bilateral knee pain.  She has been having persistent moderate throbbing pain in both knees.  Pain is worse with standing and walking going up and down stairs and getting up and down from a chair and in and out of a car.  She has benefited from injection in the past.    Pleasant and no acute distress. Walks with a antalgic gait. Stands with varus alignment of both knees. Right knee range of motion is 5-110°. There is a mild effusion. The knee is stable to varus and valgus stress Lachman and posterior drawer. There is generalized tenderness. Left knee range of motion is 5-110°. There is a mild effusion. The knee is stable to varus and valgus stress Lachman and posterior drawer. There is generalized tenderness. Both lower extremities are well perfused the skin is intact and muscle tone is adequate.    A discussion about knee arthritis was done.  Treatment options were reviewed and the decision was made to proceed with cortisone injection.  She can ice or heat and use Tylenol and avoid aggravating activities.    L Inj/Asp: bilateral knee on 4/29/2025 8:08 AM  Indications: pain  Details: 22 G needle, superolateral approach  Medications (Right): 40 mg triamcinolone acetonide 40 mg/mL; 2 mL lidocaine 10 mg/mL (1 %)  Medications (Left): 40 mg triamcinolone acetonide 40 mg/mL; 2 mL lidocaine 10 mg/mL (1 %)  Procedure, treatment alternatives, risks and benefits explained, specific risks discussed. Consent was given by the patient.

## 2025-07-29 ENCOUNTER — OFFICE VISIT (OUTPATIENT)
Dept: ORTHOPEDIC SURGERY | Facility: CLINIC | Age: 80
End: 2025-07-29
Payer: MEDICARE

## 2025-07-29 VITALS — HEIGHT: 61 IN | WEIGHT: 134 LBS | BODY MASS INDEX: 25.3 KG/M2

## 2025-07-29 DIAGNOSIS — M25.561 BILATERAL CHRONIC KNEE PAIN: ICD-10-CM

## 2025-07-29 DIAGNOSIS — M25.562 BILATERAL CHRONIC KNEE PAIN: ICD-10-CM

## 2025-07-29 DIAGNOSIS — M17.0 ARTHRITIS OF BOTH KNEES: Primary | ICD-10-CM

## 2025-07-29 DIAGNOSIS — G89.29 BILATERAL CHRONIC KNEE PAIN: ICD-10-CM

## 2025-07-29 PROCEDURE — 99213 OFFICE O/P EST LOW 20 MIN: CPT | Performed by: ORTHOPAEDIC SURGERY

## 2025-07-29 PROCEDURE — 1159F MED LIST DOCD IN RCRD: CPT | Performed by: ORTHOPAEDIC SURGERY

## 2025-07-29 PROCEDURE — 99212 OFFICE O/P EST SF 10 MIN: CPT | Mod: 25 | Performed by: ORTHOPAEDIC SURGERY

## 2025-07-29 PROCEDURE — 1036F TOBACCO NON-USER: CPT | Performed by: ORTHOPAEDIC SURGERY

## 2025-07-29 PROCEDURE — 20610 DRAIN/INJ JOINT/BURSA W/O US: CPT | Mod: 50 | Performed by: ORTHOPAEDIC SURGERY

## 2025-07-29 PROCEDURE — 2500000004 HC RX 250 GENERAL PHARMACY W/ HCPCS (ALT 636 FOR OP/ED): Performed by: ORTHOPAEDIC SURGERY

## 2025-07-29 PROCEDURE — 1160F RVW MEDS BY RX/DR IN RCRD: CPT | Performed by: ORTHOPAEDIC SURGERY

## 2025-07-29 RX ORDER — LIDOCAINE HYDROCHLORIDE 10 MG/ML
2 INJECTION, SOLUTION INFILTRATION; PERINEURAL
Status: COMPLETED | OUTPATIENT
Start: 2025-07-29 | End: 2025-07-29

## 2025-07-29 RX ORDER — TRIAMCINOLONE ACETONIDE 40 MG/ML
40 INJECTION, SUSPENSION INTRA-ARTICULAR; INTRAMUSCULAR
Status: COMPLETED | OUTPATIENT
Start: 2025-07-29 | End: 2025-07-29

## 2025-07-29 RX ADMIN — LIDOCAINE HYDROCHLORIDE 2 ML: 10 INJECTION, SOLUTION INFILTRATION; PERINEURAL at 17:44

## 2025-07-29 RX ADMIN — TRIAMCINOLONE ACETONIDE 40 MG: 400 INJECTION, SUSPENSION INTRA-ARTICULAR; INTRAMUSCULAR at 17:44

## 2025-07-29 ASSESSMENT — ENCOUNTER SYMPTOMS
DEPRESSION: 0
OCCASIONAL FEELINGS OF UNSTEADINESS: 1
LOSS OF SENSATION IN FEET: 0

## 2025-07-29 NOTE — PROGRESS NOTES
79-year-old is seen with bilateral knee pain.  She has been having persistent moderate throbbing pain in both knees.  Pain is worse with standing and walking and going up and down stairs and getting up and down from a chair and out of a car.  Cortisone injection has helped her in the past.    Pleasant and no acute distress. Walks with a antalgic gait. Stands with varus alignment of both knees. Right knee range of motion is 5-110°. There is a mild effusion. The knee is stable to varus and valgus stress Lachman and posterior drawer. There is generalized tenderness. Left knee range of motion is 5-110°. There is a mild effusion. The knee is stable to varus and valgus stress Lachman and posterior drawer. There is generalized tenderness. Both lower extremities are well perfused the skin is intact and muscle tone is adequate.    A discussion about knee arthritis was done.  Treatment options were reviewed and the decision was made to proceed with cortisone injection.  She will ice or heat and use Tylenol and avoid aggravating activities.    Patient ID: Lauren Perez is a 79 y.o. female.    L Inj/Asp: bilateral knee on 7/29/2025 5:44 PM  Indications: pain  Details: 22 G needle, superolateral approach  Medications (Right): 40 mg triamcinolone acetonide 40 mg/mL; 2 mL lidocaine 10 mg/mL (1 %)  Medications (Left): 40 mg triamcinolone acetonide 40 mg/mL; 2 mL lidocaine 10 mg/mL (1 %)  Procedure, treatment alternatives, risks and benefits explained, specific risks discussed. Consent was given by the patient.

## (undated) DEVICE — GUIDEWIRE, 0.034 IN, W/TROCAR TIP LASER LINE

## (undated) DEVICE — DRILL BIT, 2.0MM, CALIBRATED

## (undated) DEVICE — SLEEVE, VASO PRESS, CALF GARMENT, MEDIUM, GREEN

## (undated) DEVICE — Device

## (undated) DEVICE — TUBING SET, AR-200 IRRIGATION

## (undated) DEVICE — DRAPE KIT, MINI C-ARM

## (undated) DEVICE — APPLICATOR, CHLORAPREP, W/ORANGE TINT, 26ML